# Patient Record
Sex: MALE | Race: WHITE | NOT HISPANIC OR LATINO | Employment: FULL TIME | URBAN - METROPOLITAN AREA
[De-identification: names, ages, dates, MRNs, and addresses within clinical notes are randomized per-mention and may not be internally consistent; named-entity substitution may affect disease eponyms.]

---

## 2018-06-18 ENCOUNTER — TELEPHONE (OUTPATIENT)
Dept: OTOLARYNGOLOGY | Facility: CLINIC | Age: 63
End: 2018-06-18

## 2018-06-20 ENCOUNTER — TELEPHONE (OUTPATIENT)
Dept: OTOLARYNGOLOGY | Facility: CLINIC | Age: 63
End: 2018-06-20

## 2018-06-20 NOTE — TELEPHONE ENCOUNTER
Spoke with patient.  He states that Dr. Neves's office was unable to give disc and he was under the understanding that Dr. Neves had a way to send images to Dr. Segura.  I informed patient that Dr. Segura was waiting to receive images. I will ask Dr. Segura again and call patient back.  Patient is waiting on Dr. Segura's decision to coordinate appt and travel plans.

## 2018-06-20 NOTE — TELEPHONE ENCOUNTER
Spoke with patient and reassured him that he may keep his 7/10/18 appt or if he preferred Dr. Segura would be happy to see him the week before as per Dr. Segura.  Patient decided to keep appt as is.

## 2018-06-21 ENCOUNTER — TELEPHONE (OUTPATIENT)
Dept: OTOLARYNGOLOGY | Facility: CLINIC | Age: 63
End: 2018-06-21

## 2018-06-21 NOTE — TELEPHONE ENCOUNTER
Spoke with spouse and advised her as per Dr. Segura regarding flash drive and appt.  She stated she will bring in flash drive.

## 2018-07-10 ENCOUNTER — OFFICE VISIT (OUTPATIENT)
Dept: OTOLARYNGOLOGY | Facility: CLINIC | Age: 63
End: 2018-07-10
Payer: COMMERCIAL

## 2018-07-10 VITALS
BODY MASS INDEX: 28.07 KG/M2 | DIASTOLIC BLOOD PRESSURE: 96 MMHG | SYSTOLIC BLOOD PRESSURE: 149 MMHG | WEIGHT: 207.25 LBS | HEART RATE: 79 BPM | HEIGHT: 72 IN

## 2018-07-10 DIAGNOSIS — J31.0 CHRONIC RHINITIS: Primary | ICD-10-CM

## 2018-07-10 DIAGNOSIS — M85.88 MASS OF HARD PALATE: ICD-10-CM

## 2018-07-10 PROCEDURE — 99244 OFF/OP CNSLTJ NEW/EST MOD 40: CPT | Mod: 25,S$GLB,, | Performed by: OTOLARYNGOLOGY

## 2018-07-10 PROCEDURE — 99999 PR PBB SHADOW E&M-EST. PATIENT-LVL III: CPT | Mod: PBBFAC,,, | Performed by: OTOLARYNGOLOGY

## 2018-07-10 PROCEDURE — 31231 NASAL ENDOSCOPY DX: CPT | Mod: S$GLB,,, | Performed by: OTOLARYNGOLOGY

## 2018-07-10 RX ORDER — AMOXICILLIN 500 MG
CAPSULE ORAL DAILY
COMMUNITY

## 2018-07-10 RX ORDER — ASPIRIN 81 MG/1
81 TABLET ORAL DAILY
COMMUNITY

## 2018-07-10 RX ORDER — MULTIVITAMIN
1 TABLET ORAL DAILY
COMMUNITY

## 2018-07-10 NOTE — PROGRESS NOTES
Subjective:      Karthikeyan Pinon is a 63 y.o. male who was referred to me by Dr. Asher Aguayo DDS, in consultation for a sinus mass.    He was in his usual state of health until a recent dental evaluation, during which he was noted to have a prominence of the right hard palate.  He underwent CT imaging which revealed a large mass either within or adjacent to the right maxillary sinus.  He states that he was previously unaware of any problem there.  He does recall extensive dental work through the years, and also a remote incident perhaps 20 years ago of pus draining from the right upper gums.  He denies tooth pain, but does note generalized, perennial, mild severity nasal congestion bilaterally.  This seems to be worse at night, and has used occasional OTC spray in the past with limited relief.  He denies facial pain, headache, hyposmia, rhinorrhea, postnasal drip, pruritic symptoms, aural fullness or notable sinus infections.  He denies prior allergy testing or asthma, or prior nasal trauma or surgery.    SNOT-22 score = 21, NOSE score = 30%, ETDQ-7 score = 1.6    Global QOL = 75%    Days missed = Less than 5      Past Medical History  He has a past medical history of Asbestos exposure.    Past Surgical History  He has no past surgical history on file.    Family History  His family history includes Atrial fibrillation in his mother; Diabetes in his mother; Heart failure in his maternal grandmother, mother, and sister; No Known Problems in his brother, father, maternal grandfather, paternal grandfather, and paternal grandmother.    Social History  He reports that he quit smoking about 12 years ago. His smoking use included Cigarettes. He has never used smokeless tobacco.    Allergies  He has No Known Allergies.    Medications   He has a current medication list which includes the following prescription(s): aspirin, fish oil-omega-3 fatty acids, and multivitamin.    Review of Systems  Review of Systems    Constitutional: Negative for fatigue, fever and unexpected weight change.   HENT: Positive for sinus pressure and tinnitus. Negative for congestion, dental problem, ear discharge, ear pain, facial swelling, hearing loss, nosebleeds, postnasal drip, rhinorrhea, sore throat, trouble swallowing and voice change.    Eyes: Negative for photophobia, discharge, itching and visual disturbance.   Respiratory: Negative for apnea, cough, shortness of breath and wheezing.    Cardiovascular: Positive for palpitations. Negative for chest pain.   Gastrointestinal: Negative for abdominal pain, nausea and vomiting.   Endocrine: Negative for cold intolerance and heat intolerance.   Genitourinary: Negative for difficulty urinating.   Musculoskeletal: Negative for arthralgias, back pain, myalgias and neck pain.   Skin: Negative for rash.   Allergic/Immunologic: Negative for environmental allergies and food allergies.   Neurological: Negative for dizziness, seizures, syncope, weakness and headaches.   Hematological: Negative for adenopathy. Does not bruise/bleed easily.   Psychiatric/Behavioral: Negative for decreased concentration, dysphoric mood and sleep disturbance. The patient is not nervous/anxious.           Objective:     BP (!) 149/96   Pulse 79   Ht 6' (1.829 m)   Wt 94 kg (207 lb 3.7 oz)   BMI 28.11 kg/m²        Constitutional:   He appears well-developed. He is cooperative.     Head:  Normocephalic.     Nose:  No mucosal edema, rhinorrhea, septal deviation or polyps. No epistaxis. Turbinates normal, no turbinate masses and no turbinate hypertrophy.  Right sinus exhibits no maxillary sinus tenderness and no frontal sinus tenderness. Left sinus exhibits no maxillary sinus tenderness and no frontal sinus tenderness.   Elevated right nasal floor.    Mouth/Throat  Oropharynx clear and moist without lesions or asymmetry. No oropharyngeal exudate or posterior oropharyngeal erythema.   Broad-based enlargement of right bony  hard palate.  Fullness of right canine fossa.  No mucosal changes or fluctuance.      Neck:  No adenopathy. Normal range of motion present.     He has no cervical adenopathy.       Procedure    Nasal endoscopy performed.  See procedure note.     Left nasal valve     Left MT     Right nasal valve     Right IT compressed by elevated nasal floor     Right MT        Data Reviewed    No results found for: WBC  No results found for: EOSINOPHIL  No results found for: EOS  No results found for: PLT  No results found for: GLU  No results found for: IGE    I independently reviewed the images of the CT sinuses from Dr. Aguayo's office. Pertinent findings include an expensile hypodense soft tissue mass vs fluid within the bone of the right hard palate, abutting the maxillary sinus inferomedially and producing marked elevation of the right nasal floor, without evident bony erosion or destruction.       Assessment:     1. Mass of hard palate    2. Chronic rhinitis         Plan:     I had a long discussion with the patient and his wife regarding his condition and the further workup and management options.  We discussed the possible differential diagnosis, which leans toward a benign lesion based on the apparent chronicity of the findings.  We discussed that diagnosis would require a biopsy, which could only be obtained through surgical incision due to the intact bone around the lesion.  He would benefit from biopsy via a right-sided Couch-Carlos Eduardo approach for the treatment of his condition.  I discussed the risks, benefits and alternatives to surgery with the patient, as well as the expected postoperative course.  I gave him the opportunity to ask questions and I answered all of them.  I provided relevant printed information on his condition for him to review at home.  Same-day discharge is anticipated.  He may have anesthesia triage by telephone.  He will also need to stop taking aspirin 7 days prior to surgery.  The surgery will  be tentatively scheduled for July 20.  He will return for a postoperative visit 1 week after surgery.  Regarding his nasal congestion symptoms, I recommended a graduated approach consisting of nasal saline spray or rinse, OTC fluticasone spray daily, and Allegra or Claritin as needed in the evenings.    Follow-up for surgery.

## 2018-07-10 NOTE — LETTER
July 10, 2018    Asher Aguayo, DDS  [address] [place] [address]         OTOLARYNGOLOGY AND COMMUNICATION SCIENCES    Jose Elias Molina MD, FACS          SURGICAL AND MEDICAL DISEASES OF HEAD AND NECK  MD Jose Elias Peace MD, FACS  Husam Maldonado III, MD    OTOLOGY, NEUROTOLOGY and SKULL-BASE SURGERY  Talib Chappell MD, FACS  Ahsan Toribio MD, Director    PEDIATRIC OTOLARYNGOLOGY & OTOLOGY  ELODIA Dowd MD, FAAP  Teddy Diego MD, FACS    FACIAL PLASTIC and RECONSTRUCTIVE SURGERY  RAMSEY Hyman III, MD, FACS    RHINOLOGY and SINUS SURGERY  Tiago Segura MD, MPH, FACS  Director   RAMSEY Hyman III, MD, FACS    LARYNGOLOGY  Anson Gale MD    SPEECH-LANGUAGE PATHOLOGY  Siomara Escobedo, PhD, [facility]-SLP  Mary Evans, MS, CCC-SLP  Melissa Dukes, MS, CCC-SLP  Jade Gan MA, [facility]-SLP    AUDIOLOGY SECTION  Sia Au, MS, CCC-A  JACKSON Jade, CCC-A  Pam Echevarria, Laura, CCC-A  Laura Ruth, CCC-A  Major Dawn Jr., JACKSON, CCA-A  JACKSON Braden, CCC-A  Laura Burgess, CCC-A    [facility]  Head and Neck Surgical Oncology  DEVIN Fuentes, FNP-C  Pedatric Otolaryngology  DEVIN Pandey, PNP-C       Re:  Karthikeyan Pinon  :  1955    Dear Dr. Aguayo,    Thank you for referring your patient, Karthikeyan Pinon, to us for evaluation and treatment.  I have enclosed a copy of my clinic note for your review and records.  If you have any questions please do not hesitate to contact our office.     Thank you for allowing me to participate in the care of your patient.    Sincerely,        Tiago Segura MD, MPH, FACS    Director, Rhinology and Sinus Surgery  Department of Otorhinolaryngology  [facility]    Encl:  Progress note       [facility] [address] [place] [address]  phone [phone]  fax [phone]  [email or web address]

## 2018-07-10 NOTE — Clinical Note
Mariana, please print the letter and clinic note and fax to Dr. Asher Aguayo at [facility].  Thanks.

## 2018-07-11 ENCOUNTER — TELEPHONE (OUTPATIENT)
Dept: OTOLARYNGOLOGY | Facility: CLINIC | Age: 63
End: 2018-07-11

## 2018-07-11 DIAGNOSIS — M85.88 MASS OF HARD PALATE: Primary | ICD-10-CM

## 2018-07-11 DIAGNOSIS — J31.0 CHRONIC RHINITIS: ICD-10-CM

## 2018-07-11 NOTE — TELEPHONE ENCOUNTER
Spoke with patient and advised him to hold ASA 7 days prior to surgery.  Last day to take would be 7/12/18.

## 2018-07-11 NOTE — PROCEDURES
Nasal/sinus endoscopy  Date/Time: 7/10/2018 11:22 PM  Performed by: RENE AGUILERA  Authorized by: RENE AGUILERA     Consent Done?:  Yes (Verbal)  Anesthesia:     Local anesthetic:  Lidocaine 4% and Royer-Synephrine 1/2%    Patient tolerance:  Patient tolerated the procedure well with no immediate complications  Nose:     Procedure Performed:  Nasal Endoscopy  External:      No external nasal deformity  Intranasal:      Mucosa no polyps     Mucosa ulcers not present     No mucosa lesions present     Turbinates not enlarged     No septum gross deformity  Nasopharynx:      No mucosa lesions     Adenoids not present     Posterior choanae patent     Eustachian tube patent     Elevated right nasal cavity with compression of inferior turbinate and intact mucosa.  No intranasal mass or exudate.

## 2018-07-19 ENCOUNTER — ANESTHESIA EVENT (OUTPATIENT)
Dept: SURGERY | Facility: HOSPITAL | Age: 63
End: 2018-07-19
Payer: COMMERCIAL

## 2018-07-19 ENCOUNTER — TELEPHONE (OUTPATIENT)
Dept: OTOLARYNGOLOGY | Facility: CLINIC | Age: 63
End: 2018-07-19

## 2018-07-19 NOTE — PRE-PROCEDURE INSTRUCTIONS
PreOp Instructions given:   - Verbal medication information (what to hold and what to take)   - NPO guidelines   - Arrival place directions given; time to be given the day before procedure by the   Surgeon's Office   - Bathing with antibacterial soap   - Don't wear any jewelry or bring any valuables AM of surgery   - No makeup or moisturizer to face   - No perfume/cologne, powder, lotions or aftershave   Pt. verbalized understanding.   Denies any family history of side effects or issues with anesthesia or sedation.    THIS WILL BE PT'S 1st SURGICAL EXPERIENCE

## 2018-07-19 NOTE — ANESTHESIA PREPROCEDURE EVALUATION
[facility]  Anesthesia Pre-Operative Evaluation         Patient Name: Karthikeyan Pinon  YOB: 1955  MRN: [id number]    SUBJECTIVE:     Pre-operative evaluation for Procedure(s) (LRB):  STOVER-MADELYN PROCEDURE (Right)     07/19/2018    Karthikeyan Pinon is a 63 y.o. male w/ a significant PMHx of mass of the hard palate and chronic rhinitis.    Patient now presents for the above procedure(s).      LDA: None documented.     Prev airway: None documented.    Drips: None documented.      There is no problem list on file for this patient.      Review of patient's allergies indicates:  No Known Allergies    Current Inpatient Medications:      No current facility-administered medications on file prior to encounter.      Current Outpatient Prescriptions on File Prior to Encounter   Medication Sig Dispense Refill    aspirin (ECOTRIN) 81 MG EC tablet Take 81 mg by mouth once daily.       fish oil-omega-3 fatty acids 300-1,000 mg capsule Take by mouth once daily.      multivitamin (ONE DAILY MULTIVITAMIN) per tablet Take 1 tablet by mouth once daily.         No past surgical history on file.    Social History     Social History    Marital status:      Spouse name: N/A    Number of children: N/A    Years of education: N/A     Occupational History    Not on file.     Social History Main Topics    Smoking status: Former Smoker     Types: Cigarettes     Quit date: 2006    Smokeless tobacco: Never Used    Alcohol use Not on file    Drug use: Unknown    Sexual activity: Not on file     Other Topics Concern    Not on file     Social History Narrative    No narrative on file       OBJECTIVE:     Vital Signs Range (Last 24H):         CBC:   No results for input(s): WBC, RBC, HGB, HCT, PLT, MCV, MCH, MCHC in the last 72 hours.    CMP: No results for input(s): NA, K, CL, CO2, BUN, CREATININE, GLU, MG, PHOS, CALCIUM, ALBUMIN, PROT, ALKPHOS, ALT, AST, BILITOT in the last 72 hours.    INR:  No results  for input(s): PT, INR, PROTIME, APTT in the last 72 hours.    Diagnostic Studies: No relevant studies.    EKG: No recent studies available.    2D ECHO:  No results found for this or any previous visit.      ASSESSMENT/PLAN:       Anesthesia Evaluation    I have reviewed the Patient Summary Reports.     I have reviewed the Medications.     Review of Systems  Anesthesia Hx:  No problems with previous Anesthesia  History of prior surgery of interest to airway management or planning: Denies Family Hx of Anesthesia complications.   Denies Personal Hx of Anesthesia complications.   Social:  Non-Smoker    Hematology/Oncology:  Hematology Normal   Oncology Normal     EENT/Dental:EENT/Dental Normal   Cardiovascular:  Cardiovascular Normal     Pulmonary:  Pulmonary Normal    Renal/:  Renal/ Normal     Hepatic/GI:  Hepatic/GI Normal    Musculoskeletal:  Musculoskeletal Normal    Neurological:  Neurology Normal    Endocrine:  Endocrine Normal    Dermatological:  Skin Normal    Psych:  Psychiatric Normal           Physical Exam  General:  Well nourished    Airway/Jaw/Neck:  Airway Findings: Mouth Opening: Normal Tongue: Normal  General Airway Assessment: Adult  Mallampati: I  Improves to I with phonation.  TM Distance: Normal, at least 6 cm  Jaw/Neck Findings:  Neck ROM: Normal ROM     Eyes/Ears/Nose:  EYES/EARS/NOSE FINDINGS: Normal   Dental:  Dental Findings: In tact   Chest/Lungs:  Chest/Lungs Clear    Heart/Vascular:  Heart Findings: Normal    Abdomen:  Abdomen Findings: Normal    Musculoskeletal:  Musculoskeletal Findings: Normal   Skin:  Skin Findings: Normal    Mental Status:  Mental Status Findings: Normal        Anesthesia Plan  Type of Anesthesia, risks & benefits discussed:  Anesthesia Type:  general  Patient's Preference:   Intra-op Monitoring Plan: standard ASA monitors  Intra-op Monitoring Plan Comments:   Post Op Pain Control Plan: multimodal analgesia, IV/PO Opioids PRN and per primary service following  discharge from PACU  Post Op Pain Control Plan Comments:   Induction:   IV  Beta Blocker:  Patient is not currently on a Beta-Blocker (No further documentation required).       Informed Consent: Patient understands risks and agrees with Anesthesia plan.  Questions answered. Anesthesia consent signed with patient.  ASA Score: 1     Day of Surgery Review of History & Physical:    H&P update referred to the surgeon.         Ready For Surgery From Anesthesia Perspective.

## 2018-07-20 ENCOUNTER — HOSPITAL ENCOUNTER (OUTPATIENT)
Facility: HOSPITAL | Age: 63
Discharge: HOME | End: 2018-07-20
Attending: OTOLARYNGOLOGY | Admitting: OTOLARYNGOLOGY
Payer: COMMERCIAL

## 2018-07-20 ENCOUNTER — SURGERY (OUTPATIENT)
Age: 63
End: 2018-07-20

## 2018-07-20 ENCOUNTER — ANESTHESIA (OUTPATIENT)
Dept: SURGERY | Facility: HOSPITAL | Age: 63
End: 2018-07-20
Payer: COMMERCIAL

## 2018-07-20 VITALS
RESPIRATION RATE: 16 BRPM | OXYGEN SATURATION: 95 % | DIASTOLIC BLOOD PRESSURE: 91 MMHG | HEART RATE: 59 BPM | HEIGHT: 72 IN | WEIGHT: 207 LBS | SYSTOLIC BLOOD PRESSURE: 166 MMHG | TEMPERATURE: 98 F | BODY MASS INDEX: 28.04 KG/M2

## 2018-07-20 DIAGNOSIS — J34.89 MASS OF NASAL SINUS: Primary | ICD-10-CM

## 2018-07-20 PROCEDURE — 87015 SPECIMEN INFECT AGNT CONCNTJ: CPT

## 2018-07-20 PROCEDURE — 88112 CYTOPATH CELL ENHANCE TECH: CPT | Performed by: PATHOLOGY

## 2018-07-20 PROCEDURE — 27000221 HC OXYGEN, UP TO 24 HOURS

## 2018-07-20 PROCEDURE — 25000003 PHARM REV CODE 250: Performed by: OTOLARYNGOLOGY

## 2018-07-20 PROCEDURE — 63600175 PHARM REV CODE 636 W HCPCS: Performed by: OTOLARYNGOLOGY

## 2018-07-20 PROCEDURE — 27201423 OPTIME MED/SURG SUP & DEVICES STERILE SUPPLY: Performed by: OTOLARYNGOLOGY

## 2018-07-20 PROCEDURE — 87070 CULTURE OTHR SPECIMN AEROBIC: CPT

## 2018-07-20 PROCEDURE — 36000707: Performed by: OTOLARYNGOLOGY

## 2018-07-20 PROCEDURE — D9220A PRA ANESTHESIA: Mod: ,,, | Performed by: ANESTHESIOLOGY

## 2018-07-20 PROCEDURE — 94761 N-INVAS EAR/PLS OXIMETRY MLT: CPT

## 2018-07-20 PROCEDURE — 88305 TISSUE EXAM BY PATHOLOGIST: CPT | Mod: 26,,, | Performed by: PATHOLOGY

## 2018-07-20 PROCEDURE — 87206 SMEAR FLUORESCENT/ACID STAI: CPT

## 2018-07-20 PROCEDURE — 37000008 HC ANESTHESIA 1ST 15 MINUTES: Performed by: OTOLARYNGOLOGY

## 2018-07-20 PROCEDURE — 87116 MYCOBACTERIA CULTURE: CPT

## 2018-07-20 PROCEDURE — 63600175 PHARM REV CODE 636 W HCPCS: Performed by: STUDENT IN AN ORGANIZED HEALTH CARE EDUCATION/TRAINING PROGRAM

## 2018-07-20 PROCEDURE — 87102 FUNGUS ISOLATION CULTURE: CPT

## 2018-07-20 PROCEDURE — 87075 CULTR BACTERIA EXCEPT BLOOD: CPT

## 2018-07-20 PROCEDURE — 36000706: Performed by: OTOLARYNGOLOGY

## 2018-07-20 PROCEDURE — 88112 CYTOPATH CELL ENHANCE TECH: CPT | Mod: 26,,, | Performed by: PATHOLOGY

## 2018-07-20 PROCEDURE — 88305 TISSUE EXAM BY PATHOLOGIST: CPT | Performed by: PATHOLOGY

## 2018-07-20 PROCEDURE — 31030 EXPLORATION MAXILLARY SINUS: CPT | Mod: RT,,, | Performed by: OTOLARYNGOLOGY

## 2018-07-20 PROCEDURE — 71000033 HC RECOVERY, INTIAL HOUR: Performed by: OTOLARYNGOLOGY

## 2018-07-20 PROCEDURE — 25000003 PHARM REV CODE 250: Performed by: STUDENT IN AN ORGANIZED HEALTH CARE EDUCATION/TRAINING PROGRAM

## 2018-07-20 PROCEDURE — 71000015 HC POSTOP RECOV 1ST HR: Performed by: OTOLARYNGOLOGY

## 2018-07-20 PROCEDURE — 37000009 HC ANESTHESIA EA ADD 15 MINS: Performed by: OTOLARYNGOLOGY

## 2018-07-20 RX ORDER — MIDAZOLAM HYDROCHLORIDE 1 MG/ML
INJECTION, SOLUTION INTRAMUSCULAR; INTRAVENOUS
Status: DISCONTINUED | OUTPATIENT
Start: 2018-07-20 | End: 2018-07-20

## 2018-07-20 RX ORDER — ACETAMINOPHEN 500 MG
TABLET ORAL
Status: DISCONTINUED
Start: 2018-07-20 | End: 2018-07-20 | Stop reason: HOSPADM

## 2018-07-20 RX ORDER — ROCURONIUM BROMIDE 10 MG/ML
INJECTION, SOLUTION INTRAVENOUS
Status: DISCONTINUED | OUTPATIENT
Start: 2018-07-20 | End: 2018-07-20

## 2018-07-20 RX ORDER — DEXAMETHASONE SODIUM PHOSPHATE 4 MG/ML
INJECTION, SOLUTION INTRA-ARTICULAR; INTRALESIONAL; INTRAMUSCULAR; INTRAVENOUS; SOFT TISSUE
Status: DISCONTINUED | OUTPATIENT
Start: 2018-07-20 | End: 2018-07-20

## 2018-07-20 RX ORDER — HYDROMORPHONE HYDROCHLORIDE 1 MG/ML
0.2 INJECTION, SOLUTION INTRAMUSCULAR; INTRAVENOUS; SUBCUTANEOUS EVERY 5 MIN PRN
Status: DISCONTINUED | OUTPATIENT
Start: 2018-07-20 | End: 2018-07-20 | Stop reason: HOSPADM

## 2018-07-20 RX ORDER — PROPOFOL 10 MG/ML
VIAL (ML) INTRAVENOUS
Status: DISCONTINUED | OUTPATIENT
Start: 2018-07-20 | End: 2018-07-20

## 2018-07-20 RX ORDER — NEOSTIGMINE METHYLSULFATE 1 MG/ML
INJECTION, SOLUTION INTRAVENOUS
Status: DISCONTINUED | OUTPATIENT
Start: 2018-07-20 | End: 2018-07-20

## 2018-07-20 RX ORDER — PHENYLEPHRINE HYDROCHLORIDE 10 MG/ML
INJECTION INTRAVENOUS
Status: DISCONTINUED | OUTPATIENT
Start: 2018-07-20 | End: 2018-07-20

## 2018-07-20 RX ORDER — ONDANSETRON 2 MG/ML
4 INJECTION INTRAMUSCULAR; INTRAVENOUS DAILY PRN
Status: DISCONTINUED | OUTPATIENT
Start: 2018-07-20 | End: 2018-07-20 | Stop reason: HOSPADM

## 2018-07-20 RX ORDER — GLYCOPYRROLATE 0.2 MG/ML
INJECTION INTRAMUSCULAR; INTRAVENOUS
Status: DISCONTINUED | OUTPATIENT
Start: 2018-07-20 | End: 2018-07-20

## 2018-07-20 RX ORDER — CHLORHEXIDINE GLUCONATE ORAL RINSE 1.2 MG/ML
15 SOLUTION DENTAL 2 TIMES DAILY
Qty: 473 ML | Refills: 0 | Status: SHIPPED | OUTPATIENT
Start: 2018-07-20 | End: 2018-08-05

## 2018-07-20 RX ORDER — SODIUM CHLORIDE 9 MG/ML
INJECTION, SOLUTION INTRAVENOUS CONTINUOUS PRN
Status: DISCONTINUED | OUTPATIENT
Start: 2018-07-20 | End: 2018-07-20

## 2018-07-20 RX ORDER — ACETAMINOPHEN 500 MG
1000 TABLET ORAL EVERY 6 HOURS PRN
Qty: 60 TABLET | Refills: 0 | Status: SHIPPED | OUTPATIENT
Start: 2018-07-20

## 2018-07-20 RX ORDER — SODIUM CHLORIDE 0.9 % (FLUSH) 0.9 %
3 SYRINGE (ML) INJECTION
Status: DISCONTINUED | OUTPATIENT
Start: 2018-07-20 | End: 2018-07-20 | Stop reason: HOSPADM

## 2018-07-20 RX ORDER — IBUPROFEN 200 MG
TABLET ORAL
Status: DISCONTINUED
Start: 2018-07-20 | End: 2018-07-20 | Stop reason: HOSPADM

## 2018-07-20 RX ORDER — LIDOCAINE HCL/PF 100 MG/5ML
SYRINGE (ML) INTRAVENOUS
Status: DISCONTINUED | OUTPATIENT
Start: 2018-07-20 | End: 2018-07-20

## 2018-07-20 RX ORDER — ACETAMINOPHEN 500 MG
1000 TABLET ORAL ONCE
Status: COMPLETED | OUTPATIENT
Start: 2018-07-20 | End: 2018-07-20

## 2018-07-20 RX ORDER — LIDOCAINE HYDROCHLORIDE 10 MG/ML
1 INJECTION, SOLUTION EPIDURAL; INFILTRATION; INTRACAUDAL; PERINEURAL ONCE
Status: DISCONTINUED | OUTPATIENT
Start: 2018-07-20 | End: 2018-07-20 | Stop reason: HOSPADM

## 2018-07-20 RX ORDER — IBUPROFEN 800 MG/1
800 TABLET ORAL EVERY 6 HOURS PRN
Qty: 60 TABLET | Refills: 0 | Status: SHIPPED | OUTPATIENT
Start: 2018-07-20

## 2018-07-20 RX ORDER — FENTANYL CITRATE 50 UG/ML
INJECTION, SOLUTION INTRAMUSCULAR; INTRAVENOUS
Status: DISCONTINUED | OUTPATIENT
Start: 2018-07-20 | End: 2018-07-20

## 2018-07-20 RX ORDER — LIDOCAINE HYDROCHLORIDE AND EPINEPHRINE 10; 10 MG/ML; UG/ML
INJECTION, SOLUTION INFILTRATION; PERINEURAL
Status: DISCONTINUED | OUTPATIENT
Start: 2018-07-20 | End: 2018-07-20 | Stop reason: HOSPADM

## 2018-07-20 RX ORDER — IBUPROFEN 200 MG
800 TABLET ORAL ONCE
Status: COMPLETED | OUTPATIENT
Start: 2018-07-20 | End: 2018-07-20

## 2018-07-20 RX ADMIN — PROPOFOL 40 MG: 10 INJECTION, EMULSION INTRAVENOUS at 11:07

## 2018-07-20 RX ADMIN — GLYCOPYRROLATE 0.4 MG: 0.2 INJECTION, SOLUTION INTRAMUSCULAR; INTRAVENOUS at 11:07

## 2018-07-20 RX ADMIN — IBUPROFEN 800 MG: 200 TABLET, FILM COATED ORAL at 11:07

## 2018-07-20 RX ADMIN — NEOSTIGMINE METHYLSULFATE 4 MG: 1 INJECTION INTRAVENOUS at 11:07

## 2018-07-20 RX ADMIN — EPHEDRINE SULFATE 10 MG: 50 INJECTION INTRAMUSCULAR; INTRAVENOUS; SUBCUTANEOUS at 10:07

## 2018-07-20 RX ADMIN — LIDOCAINE HYDROCHLORIDE 100 MG: 20 INJECTION, SOLUTION INTRAVENOUS at 10:07

## 2018-07-20 RX ADMIN — SODIUM CHLORIDE: 0.9 INJECTION, SOLUTION INTRAVENOUS at 10:07

## 2018-07-20 RX ADMIN — PHENYLEPHRINE HYDROCHLORIDE 200 MCG: 10 INJECTION INTRAVENOUS at 10:07

## 2018-07-20 RX ADMIN — FENTANYL CITRATE 100 MCG: 50 INJECTION, SOLUTION INTRAMUSCULAR; INTRAVENOUS at 10:07

## 2018-07-20 RX ADMIN — LIDOCAINE HYDROCHLORIDE AND EPINEPHRINE 4 ML: 10; 10 INJECTION, SOLUTION INFILTRATION; PERINEURAL at 10:07

## 2018-07-20 RX ADMIN — PROPOFOL 160 MG: 10 INJECTION, EMULSION INTRAVENOUS at 10:07

## 2018-07-20 RX ADMIN — PROPOFOL 20 MG: 10 INJECTION, EMULSION INTRAVENOUS at 11:07

## 2018-07-20 RX ADMIN — ROCURONIUM BROMIDE 30 MG: 10 INJECTION, SOLUTION INTRAVENOUS at 10:07

## 2018-07-20 RX ADMIN — ACETAMINOPHEN 1000 MG: 500 TABLET ORAL at 11:07

## 2018-07-20 RX ADMIN — MIDAZOLAM HYDROCHLORIDE 2 MG: 1 INJECTION, SOLUTION INTRAMUSCULAR; INTRAVENOUS at 10:07

## 2018-07-20 RX ADMIN — PROPOFOL 30 MG: 10 INJECTION, EMULSION INTRAVENOUS at 11:07

## 2018-07-20 RX ADMIN — DEXAMETHASONE SODIUM PHOSPHATE 8 MG: 4 INJECTION, SOLUTION INTRAMUSCULAR; INTRAVENOUS at 10:07

## 2018-07-20 RX ADMIN — AMPICILLIN SODIUM AND SULBACTAM SODIUM 3 G: 2; 1 INJECTION, POWDER, FOR SOLUTION INTRAMUSCULAR; INTRAVENOUS at 10:07

## 2018-07-20 NOTE — PLAN OF CARE
AVS reviewed with pt. Pt verbalizes understanding of all instructions, medications, and follow-up appointments. Pt stable, tolerating fluids, no complaints noted. Pt appropriate for discharge at this time.

## 2018-07-20 NOTE — INTERVAL H&P NOTE
The patient has been examined and the H&P has been reviewed:    I concur with the findings and no changes have occurred since H&P was written.    Anesthesia/Surgery risks, benefits and alternative options discussed and understood by patient/family.          Active Hospital Problems    Diagnosis  POA    Mass of nasal sinus [J34.89]  Yes      Resolved Hospital Problems    Diagnosis Date Resolved POA   No resolved problems to display.

## 2018-07-20 NOTE — TRANSFER OF CARE
Anesthesia Transfer of Care Note    Patient: Karthikeyan Pinon    Procedure(s) Performed: Procedure(s) (LRB):  STOVER-MADELYN PROCEDURE (Right)    Patient location: PACU    Anesthesia Type: general    Transport from OR: Transported from OR on 6-10 L/min O2 by face mask with adequate spontaneous ventilation    Post pain: adequate analgesia    Post assessment: no apparent anesthetic complications    Post vital signs: stable    Level of consciousness: awake    Nausea/Vomiting: no nausea/vomiting    Complications: none    Transfer of care protocol was followed      Last vitals:   Visit Vitals  BP (!) 156/97 (BP Location: Right arm, Patient Position: Lying)   Pulse 67   Temp 36.4 °C (97.6 °F) (Oral)   Resp 17   Ht 6' (1.829 m)   Wt 93.9 kg (207 lb)   SpO2 98%   BMI 28.07 kg/m²

## 2018-07-20 NOTE — ANESTHESIA RELEASE NOTE
Anesthesia Release from PACU Note    Patient: Karthikeyan Pinon    Procedure(s) Performed: Procedure(s) (LRB):  STOVER-MADELYN PROCEDURE (Right)    Anesthesia type: general    Post pain: Adequate analgesia    Post assessment: no apparent anesthetic complications, tolerated procedure well and no evidence of recall    Last Vitals:   Visit Vitals  BP (!) 163/93   Pulse (!) 56   Temp 36.6 °C (97.8 °F) (Temporal)   Resp 16   Ht 6' (1.829 m)   Wt 93.9 kg (207 lb)   SpO2 (!) 94%   BMI 28.07 kg/m²       Post vital signs: stable    Level of consciousness: awake, alert  and oriented    Nausea/Vomiting: no nausea/no vomiting    Complications: none    Airway Patency: patent    Respiratory: unassisted    Cardiovascular: stable and blood pressure at baseline    Hydration: euvolemic

## 2018-07-20 NOTE — BRIEF OP NOTE
[facility]  Brief Operative Note     SUMMARY     Surgery Date: 7/20/2018     Surgeon(s) and Role:     * Tiago Segura MD - Primary     * Aníbal Jimenez MD - Resident - Assisting        Pre-op Diagnosis:  Chronic rhinitis [J31.0]  Mass of hard palate [R22.0]    Post-op Diagnosis:  Post-Op Diagnosis Codes:     * Chronic rhinitis [J31.0]     * Mass of hard palate [R22.0]    Procedure(s) (LRB):  STOVER-MADELYN PROCEDURE (Right)    Anesthesia: General    Description of the findings of the procedure: Stover-madelyn antrostomy w/ excision of cyst    Findings/Key Components: large cystic mass in palatal bone    Estimated Blood Loss: * No values recorded between 7/20/2018 10:42 AM and 7/20/2018 11:37 AM *         Specimens:   Specimen (12h ago through future)    Start     Ordered    07/20/18 1127  Specimen to Pathology - Surgery  Once     Comments:  1 Right maxillary alveolar cyst wall- permanent      07/20/18 1127    07/20/18 1120  Specimen to Pathology - Surgery  Once     Comments:  Right maxillary cyst wall - permanent      07/20/18 1120          Discharge Note    SUMMARY     Admit Date: 7/20/2018    Discharge Date and Time:  07/20/2018 11:42 AM    Hospital Course (synopsis of major diagnoses, care, treatment, and services provided during the course of the hospital stay): Following completion of an electively scheduled procedure, the patient was transferred to the PACU for postoperative monitoring. his hospital course was uneventful and noted for adequate pain control and PO intake following surgery. he is discharged home in good condition and will follow-up with [facility] in 2 week.       Final Diagnosis: Post-Op Diagnosis Codes:     * Chronic rhinitis [J31.0]     * Mass of hard palate [R22.0]    Disposition: Home or Self Care    Follow Up/Patient Instructions:     Medications:  Reconciled Home Medications:      Medication List      START taking these medications    acetaminophen 500 MG  tablet  Commonly known as:  TYLENOL  Take 2 tablets (1,000 mg total) by mouth every 6 (six) hours as needed for Pain.     chlorhexidine 0.12 % solution  Commonly known as:  PERIDEX  Use as directed 15 mLs in the mouth or throat 2 (two) times daily. for 14 days     ibuprofen 800 MG tablet  Commonly known as:  ADVIL,MOTRIN  Take 1 tablet (800 mg total) by mouth every 6 (six) hours as needed for Pain.        CONTINUE taking these medications    aspirin 81 MG EC tablet  Commonly known as:  ECOTRIN  Take 81 mg by mouth once daily.     fish oil-omega-3 fatty acids 300-1,000 mg capsule  Take by mouth once daily.     ONE DAILY MULTIVITAMIN per tablet  Generic drug:  multivitamin  Take 1 tablet by mouth once daily.            Discharge Procedure Orders  Diet Adult Regular     Notify your health care provider if you experience any of the following:  temperature >100.4     Notify your health care provider if you experience any of the following:  severe uncontrolled pain     Notify your health care provider if you experience any of the following:  redness, tenderness, or signs of infection (pain, swelling, redness, odor or green/yellow discharge around incision site)     No dressing needed     Activity as tolerated       Follow-up Information     Tiago Segura MD In 2 weeks.    Specialty:  Otolaryngology  Contact information:  [facility] [facility] [id number] [phone]             Follow up In 2 weeks.

## 2018-07-20 NOTE — OP NOTE
DATE OF OPERATION:  7/20/2018    SURGEON:  Tiago Segura MD    ASSISTANT SURGEON:  Aníbal Jimenez DO     OPERATION:     1. Transalveolar biopsy of right maxillary alveolar mass via Couch-Carlos Eduardo approach.  2. Drainage of alveolar cyst.     PREOPERATIVE DIAGNOSIS:      Right maxillary alvelolar mass.     POSTOPERATIVE DIAGNOSIS:      Right maxillary alvelolar cyst.     ANESTHESIA: General.     COMPLICATIONS: None.     ESTIMATED BLOOD LOSS: 10 mL     SPECIMEN: None.     WOUND EXPECTANCY: Clean-contaminated.     FINDINGS: Expansile mass centered in the right maxillary alveolus with superior displacement of the nasal floor and lateral displacement of the maxillary sinus floor.  Spacious cystic cavity filled with motor oil-like fluid and fibrous capsule.     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     The risks, benefits and alternatives of surgical arterial ligation were discussed with the patient as well as the expected postoperative course. I gave him the opportunity to ask questions and I answered all of them. On the morning of surgery I again met with the patient and reviewed the indications for surgery and he consented to proceed.     DESCRIPTION OF PROCEDURE: The patient was brought to the operating room and placed supine on the operating table. The patient was placed under general anesthesia and intubated. The patient was positioned with a donut under the head.  Prophylactic cefazolin was given prior to the surgery start. A time-out was performed to confirm the proper patient, site and procedure.  The CT images were reviewed prior to the surgical start. The patient was prepped and draped in the usual fashion.    Surgery began with an incision in the right sublabial sulcus. The left maxillary gingivolabial sulcus was infiltrated with 1% xylocaine with epinephrine 1:100,000 parts. The incision was deepened with a Bovie and a [place] was used to elevate the periosteum from the maxillary  bone. The infraorbital neurovascular bundle was identified and preserved. Lateral to the canine fossa the maxillary alveolus appeared to bulge anteriorly.  This area was entered laterally and inferiorly to the infraorbital foramen using a 4 mm osteotome. The osteotomy was enlarged using a rongeur. The infraorbital canal was identified in the orbital floor and dissection was limited to greater than 1 cm below this structure.  Upon entry into the lesion there was an immediate efflux of dark brown fluid resembling motor oil.  This was swabbed for culture and also sampled for cytologic analysis.  The osteotomy was enlarged to reveal a spacious cavity with bony limits distinct from the sinonasal cavity.  The maxillary sinus floor was displaced superiorly and laterally.  A fibrous capsule was present which became partially displaced after decompressing the fluid collection.  This capsule was then removed circumferentially and sent to the pathologist for evaluation.  The remaining cavity was treated with Araceli hemostatic agent, and then copiously irrigated.  There was good hemostasis. The oral soft tissue was then closed using locked running 3-0 chromic gut suture.    At this point, the drapes were taken down. The patient was turned back toward the anesthesiologist and awakened from anesthesia.  The patient was then extubated and transferred to the recovery room in stable condition.

## 2018-07-20 NOTE — PROGRESS NOTES
Dr. De La Cruz at bedside assessing pt. Reports BP OK for discharge.  MD will put in release to send patient home.

## 2018-07-20 NOTE — DISCHARGE INSTRUCTIONS
Follow directions given by Dr. Segura  Activity as tolerated  Diet as tolerated  Call MD for concerns      Discharge Instructions: After Your Surgery  Youve just had surgery. During surgery, you were given medicine called anesthesia to keep you relaxed and free of pain. After surgery, you may have some pain or nausea. This is common. Here are some tips for feeling better and getting well after surgery.     Stay on schedule with your medicine.   Going home  Your healthcare provider will show you how to take care of yourself when you go home. He or she will also answer your questions. Have an adult family member or friend drive you home. For the first 24 hours after your surgery:  · Do not drive or use heavy equipment.  · Do not make important decisions or sign legal papers.  · Do not drink alcohol.  · Have someone stay with you, if needed. He or she can watch for problems and help keep you safe.  Be sure to go to all follow-up visits with your healthcare provider. And rest after your surgery for as long as your healthcare provider tells you to.  Coping with pain  If you have pain after surgery, pain medicine will help you feel better. Take it as told, before pain becomes severe. Also, ask your healthcare provider or pharmacist about other ways to control pain. This might be with heat, ice, or relaxation. And follow any other instructions your surgeon or nurse gives you.  Tips for taking pain medicine  To get the best relief possible, remember these points:  · Pain medicines can upset your stomach. Taking them with a little food may help.  · Most pain relievers taken by mouth need at least 20 to 30 minutes to start to work.  · Taking medicine on a schedule can help you remember to take it. Try to time your medicine so that you can take it before starting an activity. This might be before you get dressed, go for a walk, or sit down for dinner.  · Constipation is a common side effect of pain medicines. Call your  healthcare provider before taking any medicines such as laxatives or stool softeners to help ease constipation. Also ask if you should skip any foods. Drinking lots of fluids and eating foods such as fruits and vegetables that are high in fiber can also help. Remember, do not take laxatives unless your surgeon has prescribed them.  · Drinking alcohol and taking pain medicine can cause dizziness and slow your breathing. It can even be deadly. Do not drink alcohol while taking pain medicine.  · Pain medicine can make you react more slowly to things. Do not drive or run machinery while taking pain medicine.  Your healthcare provider may tell you to take acetaminophen to help ease your pain. Ask him or her how much you are supposed to take each day. Acetaminophen or other pain relievers may interact with your prescription medicines or other over-the-counter (OTC) medicines. Some prescription medicines have acetaminophen and other ingredients. Using both prescription and OTC acetaminophen for pain can cause you to overdose. Read the labels on your OTC medicines with care. This will help you to clearly know the list of ingredients, how much to take, and any warnings. It may also help you not take too much acetaminophen. If you have questions or do not understand the information, ask your pharmacist or healthcare provider to explain it to you before you take the OTC medicine.  Managing nausea  Some people have an upset stomach after surgery. This is often because of anesthesia, pain, or pain medicine, or the stress of surgery. These tips will help you handle nausea and eat healthy foods as you get better. If you were on a special food plan before surgery, ask your healthcare provider if you should follow it while you get better. These tips may help:  · Do not push yourself to eat. Your body will tell you when to eat and how much.  · Start off with clear liquids and soup. They are easier to digest.  · Next try semi-solid  foods, such as mashed potatoes, applesauce, and gelatin, as you feel ready.  · Slowly move to solid foods. Dont eat fatty, rich, or spicy foods at first.  · Do not force yourself to have 3 large meals a day. Instead eat smaller amounts more often.  · Take pain medicines with a small amount of solid food, such as crackers or toast, to avoid nausea.     Call your surgeon if  · You still have pain an hour after taking medicine. The medicine may not be strong enough.  · You feel too sleepy, dizzy, or groggy. The medicine may be too strong.  · You have side effects like nausea, vomiting, or skin changes, such as rash, itching, or hives.       If you have obstructive sleep apnea  You were given anesthesia medicine during surgery to keep you comfortable and free of pain. After surgery, you may have more apnea spells because of this medicine and other medicines you were given. The spells may last longer than usual.   At home:  · Keep using the continuous positive airway pressure (CPAP) device when you sleep. Unless your healthcare provider tells you not to, use it when you sleep, day or night. CPAP is a common device used to treat obstructive sleep apnea.  · Talk with your provider before taking any pain medicine, muscle relaxants, or sedatives. Your provider will tell you about the possible dangers of taking these medicines.  Date Last Reviewed: 12/1/2016  © 0886-1682 [employer]. [address] [place] [address], [place] [address]. All rights reserved. This information is not intended as a substitute for professional medical care. Always follow your healthcare professional's instructions.

## 2018-07-20 NOTE — ANESTHESIA POSTPROCEDURE EVALUATION
Anesthesia Post Evaluation    Patient: Karthikeyan Pinon    Procedure(s) Performed: Procedure(s) (LRB):  STOVER-MADELYN PROCEDURE (Right)    Final Anesthesia Type: general  Patient location during evaluation: PACU  Patient participation: Yes- Able to Participate  Level of consciousness: awake and alert and oriented  Post-procedure vital signs: reviewed and stable  Pain management: adequate  Airway patency: patent  PONV status at discharge: No PONV  Anesthetic complications: no      Cardiovascular status: stable  Respiratory status: unassisted  Hydration status: euvolemic  Follow-up not needed.        Visit Vitals  BP (!) 163/93   Pulse (!) 56   Temp 36.6 °C (97.8 °F) (Temporal)   Resp 16   Ht 6' (1.829 m)   Wt 93.9 kg (207 lb)   SpO2 (!) 94%   BMI 28.07 kg/m²       Pain/Ulises Score: Pain Assessment Performed: Yes (7/20/2018 12:36 PM)  Presence of Pain: denies (7/20/2018 12:36 PM)  Pain Rating Prior to Med Admin: 3 (7/20/2018 11:53 AM)  Ulises Score: 9 (7/20/2018 11:45 AM)

## 2018-07-23 LAB
BACTERIA SPEC AEROBE CULT: NORMAL
BACTERIA SPEC ANAEROBE CULT: NORMAL

## 2018-07-31 ENCOUNTER — OFFICE VISIT (OUTPATIENT)
Dept: OTOLARYNGOLOGY | Facility: CLINIC | Age: 63
End: 2018-07-31
Payer: COMMERCIAL

## 2018-07-31 VITALS — HEART RATE: 81 BPM | SYSTOLIC BLOOD PRESSURE: 128 MMHG | DIASTOLIC BLOOD PRESSURE: 78 MMHG

## 2018-07-31 DIAGNOSIS — K09.1: Primary | ICD-10-CM

## 2018-07-31 PROCEDURE — 99024 POSTOP FOLLOW-UP VISIT: CPT | Mod: S$GLB,,, | Performed by: OTOLARYNGOLOGY

## 2018-07-31 PROCEDURE — 99999 PR PBB SHADOW E&M-EST. PATIENT-LVL II: CPT | Mod: PBBFAC,,, | Performed by: OTOLARYNGOLOGY

## 2018-07-31 NOTE — PROGRESS NOTES
Subjective:      Karthikeyan Pinon is a 63 y.o. male who comes for follow-up 10 days status-post Couch-Carlos Eduardo approach to right maxillary palatal cyst.  His last visit with me was on 7/10/2018.  Doing well, minor swelling and nasal mucus.    QOL assessment deferred.      Objective:     /78   Pulse 81      General:   not in distress   Nasal:  edematous mucosa   incision intact   no septal hematoma   no bleeding   Oral Cavity:   clear   right upper gingival incision intact   sutures still dissolving   Oropharynx:   no bleeding   Neck:   nontender       Procedure     None        Data Reviewed    Pathology reports still pending.  Cultures all negative to date.      Assessment:     Doing well following Couch-Carlos Eduardo approach to drainage and biopsy of a right maxillary palatal cyst.    1. Median anterior maxillary cyst         Plan:     Discussed f/u and care w/ patient.  Etiology most likely related to remote prior dental work.  Will f/u with Dr. Aguayo for future surveillance and any needed referrals.  Follow-up if symptoms worsen or fail to improve.

## 2018-08-17 ENCOUNTER — TELEPHONE (OUTPATIENT)
Dept: OTOLARYNGOLOGY | Facility: CLINIC | Age: 63
End: 2018-08-17

## 2018-08-17 NOTE — TELEPHONE ENCOUNTER
Patient called inquiring on test results.  I called lab for estimated turnaround.  Fungal culture approx 4 weeks and AFB approx 8 weeks.  Patient advised that so far now growth and will call him when final results are in.

## 2018-08-22 LAB — FUNGUS SPEC CULT: NORMAL

## 2018-09-21 LAB
ACID FAST MOD KINY STN SPEC: NORMAL
MYCOBACTERIUM SPEC QL CULT: NORMAL

## 2018-09-26 ENCOUNTER — TELEPHONE (OUTPATIENT)
Dept: OTOLARYNGOLOGY | Facility: CLINIC | Age: 63
End: 2018-09-26

## 2020-05-29 ENCOUNTER — TELEPHONE (OUTPATIENT)
Dept: OTOLARYNGOLOGY | Facility: CLINIC | Age: 65
End: 2020-05-29

## 2020-12-09 ENCOUNTER — OFFICE VISIT (OUTPATIENT)
Dept: OTOLARYNGOLOGY | Facility: CLINIC | Age: 65
End: 2020-12-09
Payer: COMMERCIAL

## 2020-12-09 VITALS — SYSTOLIC BLOOD PRESSURE: 145 MMHG | DIASTOLIC BLOOD PRESSURE: 87 MMHG | HEART RATE: 78 BPM

## 2020-12-09 DIAGNOSIS — K09.1: Primary | ICD-10-CM

## 2020-12-09 DIAGNOSIS — J31.0 CHRONIC RHINITIS: ICD-10-CM

## 2020-12-09 PROCEDURE — 1126F PR PAIN SEVERITY QUANTIFIED, NO PAIN PRESENT: ICD-10-PCS | Mod: S$GLB,,, | Performed by: OTOLARYNGOLOGY

## 2020-12-09 PROCEDURE — 99999 PR PBB SHADOW E&M-EST. PATIENT-LVL III: ICD-10-PCS | Mod: PBBFAC,,, | Performed by: OTOLARYNGOLOGY

## 2020-12-09 PROCEDURE — 99213 OFFICE O/P EST LOW 20 MIN: CPT | Mod: S$GLB,,, | Performed by: OTOLARYNGOLOGY

## 2020-12-09 PROCEDURE — 99999 PR PBB SHADOW E&M-EST. PATIENT-LVL III: CPT | Mod: PBBFAC,,, | Performed by: OTOLARYNGOLOGY

## 2020-12-09 PROCEDURE — 99213 PR OFFICE/OUTPT VISIT, EST, LEVL III, 20-29 MIN: ICD-10-PCS | Mod: S$GLB,,, | Performed by: OTOLARYNGOLOGY

## 2020-12-09 PROCEDURE — 1126F AMNT PAIN NOTED NONE PRSNT: CPT | Mod: S$GLB,,, | Performed by: OTOLARYNGOLOGY

## 2020-12-09 NOTE — PROGRESS NOTES
Subjective:      Karthikeyan is a 65 y.o. male who comes for follow-up of a palatal-alveolar cyst.  His last visit with me was on 7/31/2018.  Now 2-1/2 years status-post extirpative surgery.   He reports that over the past 2+ years he has done will without problems related to the surgery.  However, this fall he became aware that the 2 front upper central incisors no longer aligned, with  The left one protruding inferiorly more than the right one and producing an asymmetric stepped-off appearance.  He notes no other symptoms such as gingival pain, malocclusion, fistula, swelling or drainage in the mouth.  He reportedly brought this to his dentist's attention who suggested seeing me, though reportedly no imaging was done.  He notes occasional diffuse bifacial pressure episodes that occur typically upon awakening and then subside.  There is associated mild congestion though he has not specifically medicated for this.      The patient's medications, allergies, past medical, surgical, social and family histories were reviewed and updated as appropriate.    A detailed review of systems was obtained with pertinent positives as per the above HPI, and otherwise negative.        Objective:     BP (!) 145/87   Pulse 78        Constitutional:   He appears well-developed. He is cooperative.     Head:  Normocephalic.     Nose:  Mucosal edema present. No rhinorrhea, septal deviation or polyps. No epistaxis. Turbinates normal, no turbinate masses and no turbinate hypertrophy.  Right sinus exhibits no maxillary sinus tenderness and no frontal sinus tenderness. Left sinus exhibits no maxillary sinus tenderness and no frontal sinus tenderness.   Minor nonpurulent mucus    Mouth/Throat  Oropharynx clear and moist without lesions or asymmetry. No oropharyngeal exudate or posterior oropharyngeal erythema.   Mild asymmetry of palate, though much flatter than at prior exam in 2018  No fluctuance or mucosal changes of palate or gingival ridge  around incisors  Well-healed right sublabial incision, also without underlying fluctuance      Neck:  No adenopathy. Normal range of motion present.     He has no cervical adenopathy.       Procedure    None      Data Reviewed    No results found for: WBC  No results found for: EOSINOPHIL  No results found for: EOS  No results found for: PLT  No results found for: GLU  No results found for: IGE    Pathology report indicated a benign alveolar cyst.           Assessment:     1. Naso-alveolar cyst    2. Chronic rhinitis         Plan:     We had a long discussion about the likely etiology of his condition.  There is no palpable or visible manifestation of re-collection of the cyst material, and he has no symptoms to suggest that.  The asymmetry may have arisen from the expected contraction of the cyst cavity, with resultant inward retraction of the right incisor root while the left incisor remained in native position.  I advised that CT imaging would be helpful to ascertain contraction versus reaccumulation of cyst material.  He wishes to defer at this time and observe for additional symptoms.  I advised that the nasal symptoms are suggestive of environmental allergy, which may be treated initially with saline spray.  Follow up if symptoms worsen or fail to improve.

## 2024-01-29 ENCOUNTER — TELEPHONE (OUTPATIENT)
Dept: CARDIOTHORACIC SURGERY | Facility: CLINIC | Age: 69
End: 2024-01-29
Payer: COMMERCIAL

## 2024-01-29 NOTE — TELEPHONE ENCOUNTER
Returned missed call to pt on Friday and scheduled him for first available consultation appt with Dr. Lopez for evaluation of HOCM. Advised pt we will request imaging records from [facility] of his recent cardiac MRI. Faxed request to [facility] medical records dept at [phone].   Follow up today with [facility] and was advised they are in receipt of request and imaging department should be sending over to us via [employer].   Saw that pt cancelled his appt. Attempted call to pt to see if he would like to reschedule. No answer, left VM with request for call back and provided my direct line.

## 2024-02-02 ENCOUNTER — TELEPHONE (OUTPATIENT)
Dept: CARDIOTHORACIC SURGERY | Facility: CLINIC | Age: 69
End: 2024-02-02
Payer: COMMERCIAL

## 2024-04-05 ENCOUNTER — HOSPITAL ENCOUNTER (OUTPATIENT)
Facility: HOSPITAL | Age: 69
Discharge: HOME | End: 2024-04-06
Attending: EMERGENCY MEDICINE | Admitting: INTERNAL MEDICINE
Payer: COMMERCIAL

## 2024-04-05 DIAGNOSIS — Z98.890 POST-OPERATIVE STATE: ICD-10-CM

## 2024-04-05 DIAGNOSIS — I48.91 A-FIB: ICD-10-CM

## 2024-04-05 DIAGNOSIS — I42.1 HOCM (HYPERTROPHIC OBSTRUCTIVE CARDIOMYOPATHY): Primary | ICD-10-CM

## 2024-04-05 DIAGNOSIS — R07.9 CHEST PAIN: ICD-10-CM

## 2024-04-05 PROBLEM — D69.6 THROMBOCYTOPENIA: Status: ACTIVE | Noted: 2024-03-29

## 2024-04-05 PROBLEM — R74.01 TRANSAMINITIS: Status: ACTIVE | Noted: 2024-04-05

## 2024-04-05 PROBLEM — R79.89 ELEVATED TROPONIN: Status: ACTIVE | Noted: 2024-04-05

## 2024-04-05 PROBLEM — E03.8 SUBCLINICAL HYPOTHYROIDISM: Status: ACTIVE | Noted: 2024-04-05

## 2024-04-05 PROBLEM — Q23.1 BICUSPID AORTIC VALVE: Status: ACTIVE | Noted: 2023-12-11

## 2024-04-05 PROBLEM — I10 PRIMARY HYPERTENSION: Status: ACTIVE | Noted: 2024-03-28

## 2024-04-05 PROBLEM — E78.5 HLD (HYPERLIPIDEMIA): Status: ACTIVE | Noted: 2024-03-28

## 2024-04-05 PROBLEM — J98.11 ATELECTASIS: Status: ACTIVE | Noted: 2024-03-29

## 2024-04-05 PROBLEM — D62 ABLA (ACUTE BLOOD LOSS ANEMIA): Status: ACTIVE | Noted: 2024-03-28

## 2024-04-05 PROBLEM — I47.29 NSVT (NONSUSTAINED VENTRICULAR TACHYCARDIA): Status: ACTIVE | Noted: 2023-12-11

## 2024-04-05 LAB
ALBUMIN SERPL BCP-MCNC: 3.1 G/DL (ref 3.5–5.2)
ALP SERPL-CCNC: 131 U/L (ref 55–135)
ALT SERPL W/O P-5'-P-CCNC: 76 U/L (ref 10–44)
ANION GAP SERPL CALC-SCNC: 8 MMOL/L (ref 8–16)
AST SERPL-CCNC: 64 U/L (ref 10–40)
BASOPHILS # BLD AUTO: 0.07 K/UL (ref 0–0.2)
BASOPHILS NFR BLD: 0.7 % (ref 0–1.9)
BILIRUB SERPL-MCNC: 0.4 MG/DL (ref 0.1–1)
BILIRUB UR QL STRIP: NEGATIVE
BNP SERPL-MCNC: 917 PG/ML (ref 0–99)
BUN SERPL-MCNC: 24 MG/DL (ref 8–23)
CALCIUM SERPL-MCNC: 9.5 MG/DL (ref 8.7–10.5)
CHLORIDE SERPL-SCNC: 109 MMOL/L (ref 95–110)
CLARITY UR REFRACT.AUTO: CLEAR
CO2 SERPL-SCNC: 21 MMOL/L (ref 23–29)
COLOR UR AUTO: YELLOW
CREAT SERPL-MCNC: 1 MG/DL (ref 0.5–1.4)
DIFFERENTIAL METHOD BLD: ABNORMAL
EOSINOPHIL # BLD AUTO: 0.4 K/UL (ref 0–0.5)
EOSINOPHIL NFR BLD: 3.3 % (ref 0–8)
ERYTHROCYTE [DISTWIDTH] IN BLOOD BY AUTOMATED COUNT: 14.1 % (ref 11.5–14.5)
EST. GFR  (NO RACE VARIABLE): >60 ML/MIN/1.73 M^2
GLUCOSE SERPL-MCNC: 103 MG/DL (ref 70–110)
GLUCOSE UR QL STRIP: NEGATIVE
HAV IGM SERPL QL IA: NORMAL
HBV CORE IGM SERPL QL IA: NORMAL
HBV SURFACE AG SERPL QL IA: NORMAL
HCT VFR BLD AUTO: 32.6 % (ref 40–54)
HCV AB SERPL QL IA: NORMAL
HGB BLD-MCNC: 10.9 G/DL (ref 14–18)
HGB UR QL STRIP: NEGATIVE
IMM GRANULOCYTES # BLD AUTO: 0.13 K/UL (ref 0–0.04)
IMM GRANULOCYTES NFR BLD AUTO: 1.2 % (ref 0–0.5)
KETONES UR QL STRIP: NEGATIVE
LEUKOCYTE ESTERASE UR QL STRIP: NEGATIVE
LYMPHOCYTES # BLD AUTO: 2.1 K/UL (ref 1–4.8)
LYMPHOCYTES NFR BLD: 19.9 % (ref 18–48)
MAGNESIUM SERPL-MCNC: 2.2 MG/DL (ref 1.6–2.6)
MCH RBC QN AUTO: 30.2 PG (ref 27–31)
MCHC RBC AUTO-ENTMCNC: 33.4 G/DL (ref 32–36)
MCV RBC AUTO: 90 FL (ref 82–98)
MONOCYTES # BLD AUTO: 1.4 K/UL (ref 0.3–1)
MONOCYTES NFR BLD: 13.5 % (ref 4–15)
NEUTROPHILS # BLD AUTO: 6.4 K/UL (ref 1.8–7.7)
NEUTROPHILS NFR BLD: 61.4 % (ref 38–73)
NITRITE UR QL STRIP: NEGATIVE
NRBC BLD-RTO: 0 /100 WBC
OHS QRS DURATION: 148 MS
OHS QRS DURATION: 150 MS
OHS QRS DURATION: 150 MS
OHS QRS DURATION: 154 MS
OHS QRS DURATION: 156 MS
OHS QTC CALCULATION: 459 MS
OHS QTC CALCULATION: 499 MS
OHS QTC CALCULATION: 515 MS
OHS QTC CALCULATION: 529 MS
OHS QTC CALCULATION: 541 MS
PH UR STRIP: 5 [PH] (ref 5–8)
PLATELET # BLD AUTO: 269 K/UL (ref 150–450)
PMV BLD AUTO: 8.6 FL (ref 9.2–12.9)
POC CARDIAC TROPONIN I: 0.21 NG/ML (ref 0–0.08)
POCT GLUCOSE: 102 MG/DL (ref 70–110)
POCT GLUCOSE: 97 MG/DL (ref 70–110)
POTASSIUM SERPL-SCNC: 4 MMOL/L (ref 3.5–5.1)
PROT SERPL-MCNC: 6.5 G/DL (ref 6–8.4)
PROT UR QL STRIP: NEGATIVE
RBC # BLD AUTO: 3.61 M/UL (ref 4.6–6.2)
SAMPLE: ABNORMAL
SODIUM SERPL-SCNC: 138 MMOL/L (ref 136–145)
SP GR UR STRIP: 1.03 (ref 1–1.03)
T4 FREE SERPL-MCNC: 0.92 NG/DL (ref 0.71–1.51)
TROPONIN I SERPL DL<=0.01 NG/ML-MCNC: 0.4 NG/ML (ref 0–0.03)
TROPONIN I SERPL DL<=0.01 NG/ML-MCNC: 0.53 NG/ML (ref 0–0.03)
TSH SERPL DL<=0.005 MIU/L-ACNC: 5.19 UIU/ML (ref 0.4–4)
URN SPEC COLLECT METH UR: NORMAL
WBC # BLD AUTO: 10.45 K/UL (ref 3.9–12.7)

## 2024-04-05 PROCEDURE — 99204 OFFICE O/P NEW MOD 45 MIN: CPT | Mod: ,,, | Performed by: INTERNAL MEDICINE

## 2024-04-05 PROCEDURE — 80074 ACUTE HEPATITIS PANEL: CPT

## 2024-04-05 PROCEDURE — 25000003 PHARM REV CODE 250

## 2024-04-05 PROCEDURE — 93005 ELECTROCARDIOGRAM TRACING: CPT

## 2024-04-05 PROCEDURE — 83880 ASSAY OF NATRIURETIC PEPTIDE: CPT

## 2024-04-05 PROCEDURE — 93010 ELECTROCARDIOGRAM REPORT: CPT | Mod: 76,,, | Performed by: INTERNAL MEDICINE

## 2024-04-05 PROCEDURE — 25000003 PHARM REV CODE 250: Performed by: INTERNAL MEDICINE

## 2024-04-05 PROCEDURE — 81003 URINALYSIS AUTO W/O SCOPE: CPT

## 2024-04-05 PROCEDURE — 25000003 PHARM REV CODE 250: Performed by: PHYSICIAN ASSISTANT

## 2024-04-05 PROCEDURE — G0378 HOSPITAL OBSERVATION PER HR: HCPCS

## 2024-04-05 PROCEDURE — 96374 THER/PROPH/DIAG INJ IV PUSH: CPT

## 2024-04-05 PROCEDURE — 82962 GLUCOSE BLOOD TEST: CPT

## 2024-04-05 PROCEDURE — 99285 EMERGENCY DEPT VISIT HI MDM: CPT | Mod: 25

## 2024-04-05 PROCEDURE — 84484 ASSAY OF TROPONIN QUANT: CPT

## 2024-04-05 PROCEDURE — 84439 ASSAY OF FREE THYROXINE: CPT

## 2024-04-05 PROCEDURE — 80053 COMPREHEN METABOLIC PANEL: CPT

## 2024-04-05 PROCEDURE — 83735 ASSAY OF MAGNESIUM: CPT

## 2024-04-05 PROCEDURE — 85025 COMPLETE CBC W/AUTO DIFF WBC: CPT

## 2024-04-05 PROCEDURE — 84484 ASSAY OF TROPONIN QUANT: CPT | Mod: 91 | Performed by: INTERNAL MEDICINE

## 2024-04-05 PROCEDURE — 93010 ELECTROCARDIOGRAM REPORT: CPT | Mod: ,,, | Performed by: INTERNAL MEDICINE

## 2024-04-05 PROCEDURE — 84443 ASSAY THYROID STIM HORMONE: CPT

## 2024-04-05 RX ORDER — METOPROLOL TARTRATE 1 MG/ML
5 INJECTION, SOLUTION INTRAVENOUS EVERY 5 MIN PRN
Status: DISCONTINUED | OUTPATIENT
Start: 2024-04-05 | End: 2024-04-06 | Stop reason: HOSPADM

## 2024-04-05 RX ORDER — ACETAMINOPHEN 325 MG/1
650 TABLET ORAL EVERY 4 HOURS PRN
Status: DISCONTINUED | OUTPATIENT
Start: 2024-04-05 | End: 2024-04-06 | Stop reason: HOSPADM

## 2024-04-05 RX ORDER — LANOLIN ALCOHOL/MO/W.PET/CERES
400 CREAM (GRAM) TOPICAL DAILY
COMMUNITY
Start: 2024-03-31

## 2024-04-05 RX ORDER — ASPIRIN 81 MG/1
81 TABLET ORAL DAILY
Status: DISCONTINUED | OUTPATIENT
Start: 2024-04-05 | End: 2024-04-06 | Stop reason: HOSPADM

## 2024-04-05 RX ORDER — ATORVASTATIN CALCIUM 10 MG/1
10 TABLET, FILM COATED ORAL NIGHTLY
COMMUNITY
Start: 2023-07-27

## 2024-04-05 RX ORDER — POLYETHYLENE GLYCOL 3350 17 G/17G
17 POWDER, FOR SOLUTION ORAL 2 TIMES DAILY PRN
Status: DISCONTINUED | OUTPATIENT
Start: 2024-04-05 | End: 2024-04-06 | Stop reason: HOSPADM

## 2024-04-05 RX ORDER — METOPROLOL SUCCINATE 50 MG/1
50 TABLET, EXTENDED RELEASE ORAL DAILY
Status: DISCONTINUED | OUTPATIENT
Start: 2024-04-05 | End: 2024-04-06 | Stop reason: HOSPADM

## 2024-04-05 RX ORDER — ACETAMINOPHEN 500 MG
1000 TABLET ORAL EVERY 8 HOURS PRN
Status: DISCONTINUED | OUTPATIENT
Start: 2024-04-05 | End: 2024-04-06 | Stop reason: HOSPADM

## 2024-04-05 RX ORDER — POTASSIUM CHLORIDE 750 MG/1
10 TABLET, EXTENDED RELEASE ORAL DAILY
COMMUNITY
Start: 2024-03-31

## 2024-04-05 RX ORDER — PROCHLORPERAZINE EDISYLATE 5 MG/ML
5 INJECTION INTRAMUSCULAR; INTRAVENOUS EVERY 6 HOURS PRN
Status: DISCONTINUED | OUTPATIENT
Start: 2024-04-05 | End: 2024-04-06 | Stop reason: HOSPADM

## 2024-04-05 RX ORDER — BISACODYL 10 MG/1
10 SUPPOSITORY RECTAL DAILY PRN
Status: DISCONTINUED | OUTPATIENT
Start: 2024-04-05 | End: 2024-04-06 | Stop reason: HOSPADM

## 2024-04-05 RX ORDER — ATORVASTATIN CALCIUM 10 MG/1
10 TABLET, FILM COATED ORAL NIGHTLY
Status: DISCONTINUED | OUTPATIENT
Start: 2024-04-05 | End: 2024-04-06 | Stop reason: HOSPADM

## 2024-04-05 RX ORDER — ALUMINUM HYDROXIDE, MAGNESIUM HYDROXIDE, AND SIMETHICONE 1200; 120; 1200 MG/30ML; MG/30ML; MG/30ML
30 SUSPENSION ORAL 4 TIMES DAILY PRN
Status: DISCONTINUED | OUTPATIENT
Start: 2024-04-05 | End: 2024-04-06 | Stop reason: HOSPADM

## 2024-04-05 RX ORDER — ZINC GLUCONATE 50 MG
25 TABLET ORAL DAILY
COMMUNITY
End: 2024-04-05

## 2024-04-05 RX ORDER — SODIUM CHLORIDE 0.9 % (FLUSH) 0.9 %
5 SYRINGE (ML) INJECTION
Status: DISCONTINUED | OUTPATIENT
Start: 2024-04-05 | End: 2024-04-06 | Stop reason: HOSPADM

## 2024-04-05 RX ORDER — POTASSIUM CHLORIDE 750 MG/1
10 CAPSULE, EXTENDED RELEASE ORAL DAILY
Status: DISCONTINUED | OUTPATIENT
Start: 2024-04-06 | End: 2024-04-06 | Stop reason: HOSPADM

## 2024-04-05 RX ORDER — OXYCODONE HYDROCHLORIDE 5 MG/1
5 TABLET ORAL EVERY 8 HOURS PRN
Status: ON HOLD | COMMUNITY
Start: 2024-03-31 | End: 2024-04-06 | Stop reason: HOSPADM

## 2024-04-05 RX ORDER — METOPROLOL SUCCINATE 50 MG/1
25 TABLET, EXTENDED RELEASE ORAL 2 TIMES DAILY
COMMUNITY
Start: 2023-11-15

## 2024-04-05 RX ORDER — SIMETHICONE 80 MG
1 TABLET,CHEWABLE ORAL 4 TIMES DAILY PRN
Status: DISCONTINUED | OUTPATIENT
Start: 2024-04-05 | End: 2024-04-06 | Stop reason: HOSPADM

## 2024-04-05 RX ORDER — ONDANSETRON 8 MG/1
8 TABLET, ORALLY DISINTEGRATING ORAL EVERY 8 HOURS PRN
Status: DISCONTINUED | OUTPATIENT
Start: 2024-04-05 | End: 2024-04-06 | Stop reason: HOSPADM

## 2024-04-05 RX ORDER — LANOLIN ALCOHOL/MO/W.PET/CERES
400 CREAM (GRAM) TOPICAL DAILY
Status: DISCONTINUED | OUTPATIENT
Start: 2024-04-06 | End: 2024-04-06 | Stop reason: HOSPADM

## 2024-04-05 RX ORDER — NALOXONE HCL 0.4 MG/ML
0.02 VIAL (ML) INJECTION
Status: DISCONTINUED | OUTPATIENT
Start: 2024-04-05 | End: 2024-04-06 | Stop reason: HOSPADM

## 2024-04-05 RX ORDER — PANTOPRAZOLE SODIUM 20 MG/1
20 TABLET, DELAYED RELEASE ORAL DAILY
COMMUNITY
Start: 2024-03-31

## 2024-04-05 RX ORDER — HYDROCHLOROTHIAZIDE 25 MG/1
25 TABLET ORAL DAILY
COMMUNITY
End: 2024-04-05

## 2024-04-05 RX ORDER — METOPROLOL TARTRATE 50 MG/1
50 TABLET ORAL
Status: COMPLETED | OUTPATIENT
Start: 2024-04-05 | End: 2024-04-05

## 2024-04-05 RX ORDER — MECOBALAMIN 1000 MCG
TABLET,CHEWABLE ORAL
COMMUNITY
End: 2024-04-05

## 2024-04-05 RX ORDER — FUROSEMIDE 20 MG/1
20 TABLET ORAL DAILY
COMMUNITY
Start: 2024-04-01 | End: 2024-04-05

## 2024-04-05 RX ORDER — TALC
6 POWDER (GRAM) TOPICAL NIGHTLY PRN
Status: DISCONTINUED | OUTPATIENT
Start: 2024-04-05 | End: 2024-04-06 | Stop reason: HOSPADM

## 2024-04-05 RX ORDER — IPRATROPIUM BROMIDE AND ALBUTEROL SULFATE 2.5; .5 MG/3ML; MG/3ML
3 SOLUTION RESPIRATORY (INHALATION) EVERY 4 HOURS PRN
Status: DISCONTINUED | OUTPATIENT
Start: 2024-04-05 | End: 2024-04-06 | Stop reason: HOSPADM

## 2024-04-05 RX ORDER — METOPROLOL TARTRATE 1 MG/ML
5 INJECTION, SOLUTION INTRAVENOUS
Status: COMPLETED | OUTPATIENT
Start: 2024-04-05 | End: 2024-04-05

## 2024-04-05 RX ORDER — PANTOPRAZOLE SODIUM 20 MG/1
20 TABLET, DELAYED RELEASE ORAL DAILY
Status: DISCONTINUED | OUTPATIENT
Start: 2024-04-06 | End: 2024-04-06 | Stop reason: HOSPADM

## 2024-04-05 RX ORDER — DEXTROAMPHETAMINE SACCHARATE, AMPHETAMINE ASPARTATE MONOHYDRATE, DEXTROAMPHETAMINE SULFATE AND AMPHETAMINE SULFATE 5; 5; 5; 5 MG/1; MG/1; MG/1; MG/1
20 CAPSULE, EXTENDED RELEASE ORAL EVERY MORNING
COMMUNITY
End: 2024-04-05

## 2024-04-05 RX ADMIN — ATORVASTATIN CALCIUM 10 MG: 10 TABLET, FILM COATED ORAL at 10:04

## 2024-04-05 RX ADMIN — ACETAMINOPHEN 1000 MG: 500 TABLET ORAL at 11:04

## 2024-04-05 RX ADMIN — METOPROLOL TARTRATE 50 MG: 50 TABLET, FILM COATED ORAL at 03:04

## 2024-04-05 RX ADMIN — APIXABAN 5 MG: 5 TABLET, FILM COATED ORAL at 10:04

## 2024-04-05 RX ADMIN — ASPIRIN 81 MG: 81 TABLET, COATED ORAL at 11:04

## 2024-04-05 RX ADMIN — METOROPROLOL TARTRATE 5 MG: 5 INJECTION, SOLUTION INTRAVENOUS at 03:04

## 2024-04-05 RX ADMIN — METOPROLOL SUCCINATE 50 MG: 50 TABLET, EXTENDED RELEASE ORAL at 11:04

## 2024-04-05 RX ADMIN — ACETAMINOPHEN 1000 MG: 500 TABLET ORAL at 10:04

## 2024-04-05 NOTE — ED PROVIDER NOTES
"Encounter Date: 2024     Source of History:   Patient, patient's wife, and medical record, without language barrier or      Chief complaint:  Post-op Problem (Reports having "cardiac procedure" 24. Reports BP 85/73 HR of 150 at home. Denies CP at this time)    HPI:  Karthikeyan Pinon is a 68 y.o. male with history of hypertrophic cardiomyopathy status post myomectomy and aortic valve replacement on 3/28 presenting with chief complaint of postop problem.  Patient reports having rapid heart rate and episode of hypotension earlier today.  His heart rate has been varying from the 90s to 150s and he has been in AFib since arriving in the ED. He does not have a history of AFib.  He denies chest pain or shortness of breath, syncope.  He endorses transient lightheadedness that has since resolved    This is the extent to the patients complaints today here in the emergency department.    ROS: As per HPI and below:  ROS    Review of patient's allergies indicates:  No Known Allergies  PMH:  As per HPI and below:  Past Medical History:   Diagnosis Date    Asbestos exposure      Past Surgical History:   Procedure Laterality Date    STOVER MADELYN Right 2018    Procedure: STOVER-MADELYN PROCEDURE;  Surgeon: Tiago Segura MD;  Location: [facility] OR [address] [place] [address];  Service: ENT;  Laterality: Right;     Social History     Tobacco Use    Smoking status: Former     Current packs/day: 0.00     Types: Cigarettes     Quit date:      Years since quittin.2    Smokeless tobacco: Never     Vitals:    /78   Pulse 107   Temp 98.9 °F (37.2 °C) (Oral)   Resp 11   Ht 6' (1.829 m)   Wt 92.1 kg (203 lb)   SpO2 96%   BMI 27.53 kg/m²     Physical Exam  Vitals and nursing note reviewed.   Constitutional:       General: He is not in acute distress.     Appearance: He is not toxic-appearing.   HENT:      Head: Normocephalic and atraumatic.   Eyes:      General: No scleral icterus.  Cardiovascular:      Rate and Rhythm: " Tachycardia present. Rhythm irregular.      Pulses: Normal pulses.      Heart sounds: Normal heart sounds. No murmur heard.     No friction rub. No gallop.      Comments: Well-healing sternotomy scar, C/D/I  Pulmonary:      Effort: Pulmonary effort is normal. No respiratory distress.      Breath sounds: Normal breath sounds. No stridor. No wheezing, rhonchi or rales.   Abdominal:      General: Abdomen is flat. There is no distension.      Palpations: Abdomen is soft.      Tenderness: There is no abdominal tenderness. There is no guarding.   Musculoskeletal:         General: No swelling or deformity.      Cervical back: Normal range of motion and neck supple.      Right lower leg: No edema.      Left lower leg: No edema.   Skin:     General: Skin is warm and dry.      Coloration: Skin is not jaundiced.      Findings: No rash.   Neurological:      Mental Status: He is alert. Mental status is at baseline.       Procedures    Laboratory Studies:  Labs that have been ordered have been independently reviewed and interpreted by myself.  Labs Reviewed   CBC W/ AUTO DIFFERENTIAL - Abnormal; Notable for the following components:       Result Value    RBC 3.61 (*)     Hemoglobin 10.9 (*)     Hematocrit 32.6 (*)     MPV 8.6 (*)     Immature Granulocytes 1.2 (*)     Immature Grans (Abs) 0.13 (*)     Mono # 1.4 (*)     All other components within normal limits   COMPREHENSIVE METABOLIC PANEL - Abnormal; Notable for the following components:    CO2 21 (*)     BUN 24 (*)     Albumin 3.1 (*)     AST 64 (*)     ALT 76 (*)     All other components within normal limits    Narrative:     Add on TSH and MG per Dr. Lopez @ 03:44 am to order # [id number]   TROPONIN I - Abnormal; Notable for the following components:    Troponin I 0.529 (*)     All other components within normal limits    Narrative:     Add on TSH and MG per Dr. Lopez @ 03:44 am to order # [id number]   B-TYPE NATRIURETIC PEPTIDE - Abnormal; Notable for the following  components:     (*)     All other components within normal limits    Narrative:     Add on TSH and MG per Dr. Lopez @ 03:44 am to order # [id number]   TROPONIN ISTAT - Abnormal; Notable for the following components:    POC Cardiac Troponin I 0.21 (*)     All other components within normal limits   TSH - Abnormal; Notable for the following components:    TSH 5.187 (*)     All other components within normal limits    Narrative:     Add on TSH and MG per Dr. Lopez @ 03:44 am to order # [id number]   MAGNESIUM   TSH   MAGNESIUM    Narrative:     Add on TSH and MG per Dr. Lopez @ 03:44 am to order # [id number]   T4, FREE    Narrative:     Add on TSH and MG per Dr. Lopez @ 03:44 am to order # [id number]   TROPONIN I   POCT TROPONIN   POCT TROPONIN     Imaging Results               X-Ray Chest AP Portable (Final result)  Result time 04/05/24 05:21:10      Final result by Eitan Philip MD (04/05/24 05:21:10)                   Impression:      Portable chest x-ray:    A 13 mm nodular opacity projecting over the left mid to lower lung is favored to represent summation artifact, but a genuine pulmonary nodule or granuloma is not excluded.    Bibasilar pulmonary opacities, most likely subsegmental atelectasis.    For the above, recommendations include close clinical correlation and follow-up radiographs within 3 months, plus or minus chest CT if and when clinically appropriate.    Probable pulmonary emphysema.    Enlarged cardiopericardial silhouette.    Aortic valve replacement.    This report was flagged in Epic as abnormal.    DEVEN Philip MD, first observed the critical findings on 04/05/2024 at 05:17, and sent the results to Humphrey Daigle MD, via epic secure chat message on 04/05/2024 at 05:20.  Patient name and medical record number were specified/linked in the message. The messaging system provided confirmation that the message was immediately seen by the recipient.      Electronically signed by: Eitan  Cedrick  Date:    04/05/2024  Time:    05:21               Narrative:    EXAMINATION:  XR CHEST AP PORTABLE    CLINICAL HISTORY:  Unspecified atrial fibrillation    TECHNIQUE:  Single frontal view of the chest was performed.    COMPARISON:  None    FINDINGS:  Midline thoracic trachea, allowing for rightward rotation of the patient's torso.  Sternotomy wires, suboptimally visualized.    Mildly enlarged cardiopericardial silhouette.    Faintly visible cardiac valve prosthesis, likely aortic.    Relatively lucent appearance of the lungs, likely on the basis of underlying pulmonary emphysema.    A 13 mm irregularly-shaped nodular opacity projects over the left 5th anterior intercostal space and left 7th posterior intercostal space, lateral to the left midclavicular line.    Bandlike bilateral infrahilar opacities, left greater than right, most likely represent subsegmental atelectasis.    Otherwise, no consolidation, discrete pneumothorax, or blunting of either lateral costophrenic angle is demonstrated radiographically.    Degenerative changes in the suboptimally visualized spine, and in the AC joints bilaterally.    EKG leads project over the visualized torso.    No acute radiographic abnormality is demonstrated in the visualized upper abdomen.                                    EKG (independently interpreted by me): Rhythm:  AFib with RVR, rate of  127 BPM, no ST elevations or depressions, QTc 529    Imaging (independently interpreted by me):  Mild cardiomegaly    I decided to obtain the patient's medical records.  Summary of Medical Records:      Medications   metoprolol injection 5 mg (5 mg Intravenous Given 4/5/24 5426)   metoprolol tartrate (LOPRESSOR) tablet 50 mg (50 mg Oral Given 4/5/24 4001)     MDM:    68 y.o. male with A Fib with RVR in the setting of recent myomectomy and SAVR    Differential Dx:  Differential includes but is not limited to myocardial irritation, CHF, ACS less likely    ED  Management:  Cardiac workup started for an acute presentation of an emergent condition with multiple comorbidities. Metoprolol IV and PO administered for control of patients Afib with RVR. He has had a very labile heart rate while in the ED ranging from 90s to 150s. After administration of metoprolol patients heart rate has dropped into the low 100s. Initial troponin is elevated as expected in the setting of recent open heart surgery, will trend. Will admit patient to hospital medicine for further workup and treatment of his new onset Afib with RVR.    Discussed with: hospital medicine regarding admission    Medical Decision Making  Amount and/or Complexity of Data Reviewed  Labs: ordered. Decision-making details documented in ED Course.  Radiology: ordered.  ECG/medicine tests:  Decision-making details documented in ED Course.    Risk  Prescription drug management.         ED Course as of 04/05/24 0532   Fri Apr 05, 2024   0157 EKG 12-lead  No STEMI. [LP]   0414 POC Cardiac Troponin I(!): 0.21 [AW]   0415 Hemoglobin(!): 10.9 [AW]   0531 Troponin I(!): 0.529 [AW]      ED Course User Index  [AW] Harley Hawkins MD  [LP] Humphrey Daigle III, MD     Diagnostic Impression:    Final diagnoses:  [Z98.890] Post-operative state  [R07.9] Chest pain  [I48.91] A-fib               Harley Hawkins MD  Resident  04/05/24 0532

## 2024-04-05 NOTE — H&P
"[facility] - Emergency Dept  [facility] Medicine  History & Physical    Patient Name: Karthikeyan Pinon  MRN: [id number]  Patient Class: OP- Observation  Admission Date: 4/5/2024  Attending Physician: Hunter Hunt MD   Primary Care Provider: Kourtney Love MD         Patient information was obtained from patient, past medical records, and ER records.     Subjective:     Principal Problem:Atrial fibrillation    Chief Complaint:   Chief Complaint   Patient presents with    Post-op Problem     Reports having "cardiac procedure" 03/28/24. Reports BP 85/73 HR of 150 at home. Denies CP at this time        HPI: Karthikeyan Pinon is a 68 y.o. male with history of hypertrophic cardiomyopathy status post myectomy and aortic valve replacement on 3/28 presenting with chief complaint of postop problem.  Patient reports having rapid heart rate and episode of hypotension earlier today.  His heart rate has been varying from the 90s to 150s and he has been in AFib since arriving in the ED. He does not have a history of AFib.  He denies chest pain or shortness of breath, syncope.  He endorses transient lightheadedness that has since resolved     In ED: Afebrile. HR ranging from 90s to 150. Otherwise, VSS and satting well on RA. No leukocytosis. Hgb 10.9. CMP remarkable for BUN 24, albumin 3.1, AST 64 and ALT 76. . Trop 0.529. TSH 5.1, but T4 wnl. CXR with 13 mm nodular opacity (likely artifact, but nodule or granuloma not excluded), atelectasis, probably pulmonary emphysema, enlarged cardiopericardial silhouette, and AV replacement. Given IV metoprolol 5 mg x1 and po lopressor 50 mg. Placed in observation.     Past Medical History:   Diagnosis Date    Asbestos exposure 1975       Past Surgical History:   Procedure Laterality Date    STOVER MADELYN Right 7/20/2018    Procedure: STOVER-MADELYN PROCEDURE;  Surgeon: Tiago Segura MD;  Location: [facility] OR [address] [place] [address];  Service: ENT;  Laterality: Right;       Review of patient's allergies " indicates:  No Known Allergies    No current facility-administered medications on file prior to encounter.     Current Outpatient Medications on File Prior to Encounter   Medication Sig    atorvastatin (LIPITOR) 10 MG tablet Take 10 mg by mouth every evening.    furosemide (LASIX) 20 MG tablet Take 20 mg by mouth once daily.    magnesium oxide (MAG-OX) 400 mg (241.3 mg magnesium) tablet Take 400 mg by mouth once daily.    metoprolol succinate (TOPROL-XL) 50 MG 24 hr tablet Take 50 mg by mouth once daily.    multivitamin-iron-folic acid Tab Take 1 tablet by mouth once daily.    oxyCODONE (ROXICODONE) 5 MG immediate release tablet Take 5 mg by mouth every 8 (eight) hours as needed for Pain.    pantoprazole (PROTONIX) 20 MG tablet Take 20 mg by mouth once daily.    potassium chloride (KLOR-CON) 10 MEQ TbSR Take 10 mEq by mouth once daily.    acetaminophen (TYLENOL) 500 MG tablet Take 2 tablets (1,000 mg total) by mouth every 6 (six) hours as needed for Pain.    aspirin (ECOTRIN) 81 MG EC tablet Take 81 mg by mouth once daily.     dextroamphetamine-amphetamine (ADDERALL XR) 20 MG 24 hr capsule Take 20 mg by mouth every morning.    fish oil-omega-3 fatty acids 300-1,000 mg capsule Take by mouth once daily.    hydroCHLOROthiazide (HYDRODIURIL) 25 MG tablet Take 25 mg by mouth once daily.    ibuprofen (ADVIL,MOTRIN) 800 MG tablet Take 1 tablet (800 mg total) by mouth every 6 (six) hours as needed for Pain.    mecobalamin, vitamin B12, 1,000 mcg Chew Take by mouth.    multivitamin (ONE DAILY MULTIVITAMIN) per tablet Take 1 tablet by mouth once daily.    zinc gluconate 50 mg tablet Take 25 mg by mouth once daily.     Family History       Problem Relation (Age of Onset)    Atrial fibrillation Mother    Diabetes Mother    Heart failure Mother, Sister, Maternal Grandmother    No Known Problems Father, Brother, Maternal Grandfather, Paternal Grandmother, Paternal Grandfather          Tobacco Use    Smoking status: Former      Current packs/day: 0.00     Types: Cigarettes     Quit date: 2006     Years since quittin.2    Smokeless tobacco: Never   Substance and Sexual Activity    Alcohol use: Not on file    Drug use: Not on file    Sexual activity: Not on file     Review of Systems   Constitutional:  Negative for activity change, chills and fever.   HENT:  Negative for trouble swallowing.    Eyes:  Negative for photophobia and visual disturbance.   Respiratory:  Negative for chest tightness, shortness of breath and wheezing.    Cardiovascular:  Positive for palpitations. Negative for chest pain and leg swelling.   Gastrointestinal:  Negative for abdominal pain, constipation, diarrhea, nausea and vomiting.   Genitourinary:  Negative for dysuria, frequency, hematuria and urgency.   Musculoskeletal:  Negative for arthralgias, back pain and gait problem.   Skin:  Negative for color change and rash.   Neurological:  Negative for dizziness, syncope, weakness, light-headedness, numbness and headaches.   Psychiatric/Behavioral:  Negative for agitation and confusion. The patient is not nervous/anxious.      Objective:     Vital Signs (Most Recent):  Temp: 98.9 °F (37.2 °C) (24 0150)  Pulse: (!) 128 (24)  Resp: 16 (24)  BP: 110/78 (24)  SpO2: 95 % (24) Vital Signs (24h Range):  Temp:  [98.9 °F (37.2 °C)] 98.9 °F (37.2 °C)  Pulse:  [] 128  Resp:  [11-20] 16  SpO2:  [95 %-97 %] 95 %  BP: (107-150)/(57-83) 110/78     Weight: 92.1 kg (203 lb)  Body mass index is 27.53 kg/m².     Physical Exam  Vitals and nursing note reviewed.   Constitutional:       General: He is not in acute distress.     Appearance: He is well-developed.   HENT:      Head: Normocephalic and atraumatic.      Mouth/Throat:      Pharynx: No oropharyngeal exudate.   Eyes:      Conjunctiva/sclera: Conjunctivae normal.      Pupils: Pupils are equal, round, and reactive to light.   Cardiovascular:      Rate and Rhythm: Rhythm  irregular.      Heart sounds: Normal heart sounds.   Pulmonary:      Effort: Pulmonary effort is normal. No respiratory distress.      Breath sounds: Normal breath sounds. No wheezing.   Abdominal:      General: Bowel sounds are normal. There is no distension.      Palpations: Abdomen is soft.      Tenderness: There is no abdominal tenderness.   Musculoskeletal:         General: No tenderness. Normal range of motion.      Cervical back: Normal range of motion and neck supple.   Lymphadenopathy:      Cervical: No cervical adenopathy.   Skin:     General: Skin is warm and dry.      Capillary Refill: Capillary refill takes less than 2 seconds.      Findings: No rash.   Neurological:      Mental Status: He is alert and oriented to person, place, and time.      Cranial Nerves: No cranial nerve deficit.      Sensory: No sensory deficit.      Coordination: Coordination normal.   Psychiatric:         Behavior: Behavior normal.         Thought Content: Thought content normal.         Judgment: Judgment normal.              CRANIAL NERVES     CN III, IV, VI   Pupils are equal, round, and reactive to light.       Significant Labs: All pertinent labs within the past 24 hours have been reviewed.  CBC:   Recent Labs   Lab 04/05/24 0328   WBC 10.45   HGB 10.9*   HCT 32.6*        CMP:   Recent Labs   Lab 04/05/24 0328      K 4.0      CO2 21*      BUN 24*   CREATININE 1.0   CALCIUM 9.5   PROT 6.5   ALBUMIN 3.1*   BILITOT 0.4   ALKPHOS 131   AST 64*   ALT 76*   ANIONGAP 8     Cardiac Markers:   Recent Labs   Lab 04/05/24 0328   *     Troponin:   Recent Labs   Lab 04/05/24 0328   TROPONINI 0.529*     TSH:   Recent Labs   Lab 04/05/24 0328   TSH 5.187*       Significant Imaging: I have reviewed all pertinent imaging results/findings within the past 24 hours.  Imaging Results               X-Ray Chest AP Portable (Final result)  Result time 04/05/24 05:21:10      Final result by Eitan Philip MD  (04/05/24 05:21:10)                   Impression:      Portable chest x-ray:    A 13 mm nodular opacity projecting over the left mid to lower lung is favored to represent summation artifact, but a genuine pulmonary nodule or granuloma is not excluded.    Bibasilar pulmonary opacities, most likely subsegmental atelectasis.    For the above, recommendations include close clinical correlation and follow-up radiographs within 3 months, plus or minus chest CT if and when clinically appropriate.    Probable pulmonary emphysema.    Enlarged cardiopericardial silhouette.    Aortic valve replacement.    This report was flagged in Epic as abnormal.    DEVEN Philip MD, first observed the critical findings on 04/05/2024 at 05:17, and sent the results to Humphrey Daigle MD, via epic secure chat message on 04/05/2024 at 05:20.  Patient name and medical record number were specified/linked in the message. The messaging system provided confirmation that the message was immediately seen by the recipient.      Electronically signed by: Eitan Philip  Date:    04/05/2024  Time:    05:21               Narrative:    EXAMINATION:  XR CHEST AP PORTABLE    CLINICAL HISTORY:  Unspecified atrial fibrillation    TECHNIQUE:  Single frontal view of the chest was performed.    COMPARISON:  None    FINDINGS:  Midline thoracic trachea, allowing for rightward rotation of the patient's torso.  Sternotomy wires, suboptimally visualized.    Mildly enlarged cardiopericardial silhouette.    Faintly visible cardiac valve prosthesis, likely aortic.    Relatively lucent appearance of the lungs, likely on the basis of underlying pulmonary emphysema.    A 13 mm irregularly-shaped nodular opacity projects over the left 5th anterior intercostal space and left 7th posterior intercostal space, lateral to the left midclavicular line.    Bandlike bilateral infrahilar opacities, left greater than right, most likely represent subsegmental atelectasis.    Otherwise,  no consolidation, discrete pneumothorax, or blunting of either lateral costophrenic angle is demonstrated radiographically.    Degenerative changes in the suboptimally visualized spine, and in the AC joints bilaterally.    EKG leads project over the visualized torso.    No acute radiographic abnormality is demonstrated in the visualized upper abdomen.                                    Assessment/Plan:     * Atrial fibrillation  Patient with Paroxysmal (<7 days) atrial fibrillation which is uncontrolled currently with Beta Blocker. Patient is currently in atrial fibrillation.[id number] Score: 1.  Anticoagulation not indicated due to [id number] of 1  - CXR without consolidation, UA pending  - electrolytes wnl  - likely triggered by recent procedure   - Cardiology consulted for new onset A fib; appreciate recs  - NPO   - monitor tele     Subclinical hypothyroidism  - TSH 5.18, but T4 wnl  - follow up with PCP and repeat labs OP    Transaminitis  - AST 64 and ALT 76 on admit; unknown baseline  - denies abdominal pain, n/v/  - hepatitis panel pending   - hold statin  - daily cmp    Elevated troponin  - likely from recent myectomy and aortic valve replacement on 3/28, but unknown baseline. Will continue to trend q6h    Primary hypertension  - reports episode of hypotension PTA. Normotensive in ED  - will continue home metoprolol  - hold lasix and HCTZ. Monitor BP closely and will restart when clinically warranted    HOCM (hypertrophic obstructive cardiomyopathy)  S/p septal myectomy and AV replacement on 03/28    HLD (hyperlipidemia)  - hold statin in setting of transaminitis       VTE Risk Mitigation (From admission, onward)           Ordered     IP VTE LOW RISK PATIENT  Once         04/05/24 0632                         On 04/05/2024, patient should be placed in hospital observation services under my care in collaboration with Dr. Hunter Hunt.           Jeffrey Shankar PA-C  Department of Hospital Medicine  [facility] - Emergency Dept

## 2024-04-05 NOTE — ASSESSMENT & PLAN NOTE
- AST 64 and ALT 76 on admit; unknown baseline  - denies abdominal pain, n/v/  - hepatitis panel pending   - hold statin  - daily cmp   The medication was DENIED; OTC PRODUCTS ARE A PLAN EXCLUSION. DENIAL letter uploaded to MED[employer]. If you want an APPEAL; please note in this encounter what new information you would like to APPEAL with. Once complete route back to PA POOL. If this requires a response please respond to the pool ( [phone]). Thank you please advise patient.

## 2024-04-05 NOTE — PROVIDER PROGRESS NOTES - EMERGENCY DEPT.
Encounter Date: 4/5/2024    ED Physician Progress Notes        ED Resident HAND-OFF NOTE:  Karthikeyan Pinon is a 68 y.o. male who presented to the ED on 4/5/2024, patient C/O postop problem. I assumed care of patient from off-going ED physician team patient pending admission to .    On my evaluation, Karthikeyan Pinon appears well, hemodynamically stable and in NAD. Thus far, Karthikeyan Pinon has received:  Medications   metoprolol injection 5 mg (5 mg Intravenous Given 4/5/24 0346)   metoprolol tartrate (LOPRESSOR) tablet 50 mg (50 mg Oral Given 4/5/24 4259)       /72   Pulse 107   Temp 98.9 °F (37.2 °C) (Oral)   Resp 14   Ht 6' (1.829 m)   Wt 92.1 kg (203 lb)   SpO2 97%   BMI 27.53 kg/m²         Disposition: I anticipate patient will be admitted.  ______________________  Glen Simms MD   Emergency Medicine Resident      UPDATE:         :  Post-operative state  Chest pain  A-fib

## 2024-04-05 NOTE — PHARMACY MED REC
"Admission Medication History     The home medication history was taken by Barb Gonzalez.    You may go to "Admission" then "Reconcile Home Medications" tabs to review and/or act upon these items.     The home medication list has been updated by the Pharmacy department.   Please read ALL comments highlighted in yellow.   Please address this information as you see fit.    Feel free to contact us if you have any questions or require assistance.      The medications listed below were removed from the home medication list. Please reorder if appropriate:  Patient reports no longer taking the following medication(s):  ACETAMINOPHEN 500 MG  DEXTROAMPHETAMINE-AMPHETAMINE 20 MG 24 HR  FISH OIL-OMEGA-3 FATTY ACIDS 300-1000 MG  FUROSEMIDE 20 MG  HYDROCHLOROTHIAZIDE 25 MG  IBUPROFEN 800 MG  MECOBALAMIN, 1,000 MCG CHEW  MULTIVITAMIN TAB  ZINC GLUCONATE 50 MG    Medications listed below were obtained from: Patient/family and Analytic software- bttn  Current Outpatient Medications on File Prior to Encounter   Medication Sig    aspirin 81 MG Chew   Take 81 mg by mouth once daily.    atorvastatin (LIPITOR) 10 MG tablet   Take 10 mg by mouth every evening.    magnesium oxide (MAG-OX) 400 mg (241.3 mg   magnesium) tablet   Take 400 mg by mouth once daily.    metoprolol succinate (TOPROL-XL) 50 MG 24 hr tablet   Take 25 mg by mouth 2 (two) times daily.    pantoprazole (PROTONIX) 20 MG tablet   Take 20 mg by mouth once daily.    potassium chloride (KLOR-CON) 10 MEQ TbSR Take 10 mEq by mouth once daily.     Potential issues to be addressed PRIOR TO DISCHARGE  Patient reported not taking the following medications: (ROXICODONE). These medications remain on the home medication list. Please address accordingly.           Barb Gonzalez  EXT [phone]                  .          "

## 2024-04-05 NOTE — ASSESSMENT & PLAN NOTE
Patient with Paroxysmal (<7 days) atrial fibrillation which is uncontrolled currently with Beta Blocker. Patient is currently in atrial fibrillation.[id number] Score: 1.  Anticoagulation not indicated due to [id number] of 1  - CXR without consolidation, UA pending  - electrolytes wnl  - likely triggered by recent procedure   - Cardiology consulted for new onset A fib; appreciate recs  - NPO   - monitor tele

## 2024-04-05 NOTE — HPI
68 y.o. male with history of hypertrophic cardiomyopathy status post myomectomy and aortic valve replacement on 3/28 consulted for paroxysmal SVT s/p recent myectomy.     Patient presented with palpitations with a heart rate of the 150s.  He states that it was last a couple of seconds then resolves on its own.  He called a [facility] nurse who advised him to go to the ED. He reported of a long history of palpitations in the past however was recently evaluated approximately 5 months ago for this. Patient is a patient of KIMMIE Vidal, at [facility] who recommended ICD placement.  Prior to that he was complaining presyncopal episodes during exertion.  He was found to have HOCM and was seen at the [facility] for a septal myomectomy an aortic valve replaced. Procedure done by Lee Izaguirre MD, S/P on 3/28/2024: Aortic Valve Replacement (Inspiris #25), Septal myectomy (16.5 Gr). Last saw Cardiology at [facility] on 4/2 for a pos-op follow up.  He recently returned 3 days ago.  No worsening symptoms.  Denies dyspnea exertion, leg swelling, shortness for breath.  He is compliant with his home medications and weighs himself daily with no weight gain    While in the ED, patient received 5 of metoprolol IV and started on home PO of Toprol 50 mg.     Patient vitals tachycardic 122, normotensive systolic 100 to 130 satting 97%.  ED workup 10 CMP was stable with. Elevated BNP of 917, troponin peaked at 0.529 downtrending to 0.395. TSH elevated at 5.1, but normal T4. Chest x-ray revealing bibasilar pulmonary opacities, most likely subsegmental atelectasis. Enlarged cardiopericardial silhouette. Aortic valve replacement.     Initial EKG: Sinus rhythm with rate of 96, 215 patient developed afib w/ RVR. 339 patient had wide complex tachycardia.

## 2024-04-05 NOTE — ED TRIAGE NOTES
"Karthikeyan Pinon, a 68 y.o. male presents to the ED w/ complaint of low blood pressure and heart rate in the 150's. Patient states he had a valve replacement in march at the [facility] in [place]. Patient denies C/P, SOB, N/V/D.     Triage note:  Chief Complaint   Patient presents with    Post-op Problem     Reports having "cardiac procedure" 03/28/24. Reports BP 85/73 HR of 150 at home. Denies CP at this time     Review of patient's allergies indicates:  No Known Allergies  Past Medical History:   Diagnosis Date    Asbestos exposure 1975       "

## 2024-04-05 NOTE — SUBJECTIVE & OBJECTIVE
Past Medical History:   Diagnosis Date    Asbestos exposure 1975       Past Surgical History:   Procedure Laterality Date    STOVER MADELYN Right 7/20/2018    Procedure: STOVER-MADELYN PROCEDURE;  Surgeon: Tiago Segura MD;  Location: [facility] OR [address] [place] [address];  Service: ENT;  Laterality: Right;       Review of patient's allergies indicates:  No Known Allergies    No current facility-administered medications on file prior to encounter.     Current Outpatient Medications on File Prior to Encounter   Medication Sig    atorvastatin (LIPITOR) 10 MG tablet Take 10 mg by mouth every evening.    furosemide (LASIX) 20 MG tablet Take 20 mg by mouth once daily.    magnesium oxide (MAG-OX) 400 mg (241.3 mg magnesium) tablet Take 400 mg by mouth once daily.    metoprolol succinate (TOPROL-XL) 50 MG 24 hr tablet Take 50 mg by mouth once daily.    multivitamin-iron-folic acid Tab Take 1 tablet by mouth once daily.    oxyCODONE (ROXICODONE) 5 MG immediate release tablet Take 5 mg by mouth every 8 (eight) hours as needed for Pain.    pantoprazole (PROTONIX) 20 MG tablet Take 20 mg by mouth once daily.    potassium chloride (KLOR-CON) 10 MEQ TbSR Take 10 mEq by mouth once daily.    acetaminophen (TYLENOL) 500 MG tablet Take 2 tablets (1,000 mg total) by mouth every 6 (six) hours as needed for Pain.    aspirin (ECOTRIN) 81 MG EC tablet Take 81 mg by mouth once daily.     dextroamphetamine-amphetamine (ADDERALL XR) 20 MG 24 hr capsule Take 20 mg by mouth every morning.    fish oil-omega-3 fatty acids 300-1,000 mg capsule Take by mouth once daily.    hydroCHLOROthiazide (HYDRODIURIL) 25 MG tablet Take 25 mg by mouth once daily.    ibuprofen (ADVIL,MOTRIN) 800 MG tablet Take 1 tablet (800 mg total) by mouth every 6 (six) hours as needed for Pain.    mecobalamin, vitamin B12, 1,000 mcg Chew Take by mouth.    multivitamin (ONE DAILY MULTIVITAMIN) per tablet Take 1 tablet by mouth once daily.    zinc gluconate 50 mg tablet Take 25 mg by mouth once  daily.     Family History       Problem Relation (Age of Onset)    Atrial fibrillation Mother    Diabetes Mother    Heart failure Mother, Sister, Maternal Grandmother    No Known Problems Father, Brother, Maternal Grandfather, Paternal Grandmother, Paternal Grandfather          Tobacco Use    Smoking status: Former     Current packs/day: 0.00     Types: Cigarettes     Quit date:      Years since quittin.2    Smokeless tobacco: Never   Substance and Sexual Activity    Alcohol use: Not on file    Drug use: Not on file    Sexual activity: Not on file     Review of Systems   Cardiovascular:  Positive for palpitations.     Objective:     Vital Signs (Most Recent):  Temp: 98.7 °F (37.1 °C) (24 1206)  Pulse: 90 (24 1206)  Resp: 17 (24 1206)  BP: 110/72 (24 1206)  SpO2: 97 % (24 1206) Vital Signs (24h Range):  Temp:  [98.2 °F (36.8 °C)-98.9 °F (37.2 °C)] 98.7 °F (37.1 °C)  Pulse:  [] 90  Resp:  [11-20] 17  SpO2:  [95 %-97 %] 97 %  BP: (107-150)/(57-83) 110/72     Weight: 92.1 kg (203 lb)  Body mass index is 27.53 kg/m².    SpO2: 97 %       No intake or output data in the 24 hours ending 24 1329    Lines/Drains/Airways       Peripheral Intravenous Line  Duration                  Peripheral IV - Single Lumen 24 0325 20 G Left Wrist <1 day                     Physical Exam  Vitals and nursing note reviewed.   HENT:      Head: Normocephalic and atraumatic.      Nose: Nose normal.   Eyes:      General: No scleral icterus.  Cardiovascular:      Rate and Rhythm: Normal rate. Rhythm irregular.   Pulmonary:      Effort: Pulmonary effort is normal.   Abdominal:      Palpations: Abdomen is soft.   Musculoskeletal:         General: Normal range of motion.      Right lower leg: No edema.      Left lower leg: No edema.   Skin:     General: Skin is warm.   Neurological:      General: No focal deficit present.      Mental Status: He is alert. Mental status is at baseline.           Significant Labs: BMP:   Recent Labs   Lab 04/05/24  0328         K 4.0      CO2 21*   BUN 24*   CREATININE 1.0   CALCIUM 9.5   MG 2.2    and CMP   Recent Labs   Lab 04/05/24  0328      K 4.0      CO2 21*      BUN 24*   CREATININE 1.0   CALCIUM 9.5   PROT 6.5   ALBUMIN 3.1*   BILITOT 0.4   ALKPHOS 131   AST 64*   ALT 76*   ANIONGAP 8       Significant Imaging:

## 2024-04-05 NOTE — CONSULTS
[facility] Emergency Dept  Cardiology  Consult Note    Patient Name: Karthikeyan Pinon  MRN: [id number]  Admission Date: 4/5/2024  Hospital Length of Stay: 0 days  Code Status: Full Code   Attending Provider: Hunter Hunt MD   Consulting Provider: Drake Correa DO  Primary Care Physician: Kourtney Love MD  Principal Problem:Atrial fibrillation    Patient information was obtained from patient and ER records.     Inpatient consult to Cardiology  Consult performed by: Drake Correa DO  Consult ordered by: Jennifer Delacruz MD        Subjective:     Chief Complaint:  SVT s/p myomectomy      HPI:   68 y.o. male with history of hypertrophic cardiomyopathy status post myomectomy and aortic valve replacement on 3/28 consulted for paroxysmal SVT s/p recent myectomy.     Patient presented with palpitations with a heart rate of the 150s.  He states that it was last a couple of seconds then resolves on its own.  He called a [facility] nurse who advised him to go to the ED. He reported of a long history of palpitations in the past however was recently evaluated approximately 5 months ago for this. Patient is a patient of KIMMIE Vidal, at [facility] who recommended ICD placement.  Prior to that he was complaining presyncopal episodes during exertion.  He was found to have HOCM and was seen at the [facility] for a septal myomectomy an aortic valve replaced. Procedure done by Lee Izaguirre MD, S/P on 3/28/2024: Aortic Valve Replacement (Inspiris #25), Septal myectomy (16.5 Gr). Last saw Cardiology at [facility] on 4/2 for a pos-op follow up.  He recently returned 3 days ago.  No worsening symptoms.  Denies dyspnea exertion, leg swelling, shortness for breath.  He is compliant with his home medications and weighs himself daily with no weight gain    While in the ED, patient received 5 of metoprolol IV and started on home PO of Toprol 50 mg.     Patient vitals tachycardic 122, normotensive systolic 100  to 130 satting 97%.  ED workup 10 CMP was stable with. Elevated BNP of 917, troponin peaked at 0.529 downtrending to 0.395. TSH elevated at 5.1, but normal T4. Chest x-ray revealing bibasilar pulmonary opacities, most likely subsegmental atelectasis. Enlarged cardiopericardial silhouette. Aortic valve replacement.     Initial EKG: Sinus rhythm with rate of 96, 215 patient developed afib w/ RVR. 339 patient had wide complex tachycardia.           Past Medical History:   Diagnosis Date    Asbestos exposure 1975       Past Surgical History:   Procedure Laterality Date    STOVER MADELYN Right 7/20/2018    Procedure: STOVER-MADELYN PROCEDURE;  Surgeon: Tiago Segura MD;  Location: [facility] OR [address] [place] [address];  Service: ENT;  Laterality: Right;       Review of patient's allergies indicates:  No Known Allergies    No current facility-administered medications on file prior to encounter.     Current Outpatient Medications on File Prior to Encounter   Medication Sig    atorvastatin (LIPITOR) 10 MG tablet Take 10 mg by mouth every evening.    furosemide (LASIX) 20 MG tablet Take 20 mg by mouth once daily.    magnesium oxide (MAG-OX) 400 mg (241.3 mg magnesium) tablet Take 400 mg by mouth once daily.    metoprolol succinate (TOPROL-XL) 50 MG 24 hr tablet Take 50 mg by mouth once daily.    multivitamin-iron-folic acid Tab Take 1 tablet by mouth once daily.    oxyCODONE (ROXICODONE) 5 MG immediate release tablet Take 5 mg by mouth every 8 (eight) hours as needed for Pain.    pantoprazole (PROTONIX) 20 MG tablet Take 20 mg by mouth once daily.    potassium chloride (KLOR-CON) 10 MEQ TbSR Take 10 mEq by mouth once daily.    acetaminophen (TYLENOL) 500 MG tablet Take 2 tablets (1,000 mg total) by mouth every 6 (six) hours as needed for Pain.    aspirin (ECOTRIN) 81 MG EC tablet Take 81 mg by mouth once daily.     dextroamphetamine-amphetamine (ADDERALL XR) 20 MG 24 hr capsule Take 20 mg by mouth every morning.    fish oil-omega-3 fatty acids  300-1,000 mg capsule Take by mouth once daily.    hydroCHLOROthiazide (HYDRODIURIL) 25 MG tablet Take 25 mg by mouth once daily.    ibuprofen (ADVIL,MOTRIN) 800 MG tablet Take 1 tablet (800 mg total) by mouth every 6 (six) hours as needed for Pain.    mecobalamin, vitamin B12, 1,000 mcg Chew Take by mouth.    multivitamin (ONE DAILY MULTIVITAMIN) per tablet Take 1 tablet by mouth once daily.    zinc gluconate 50 mg tablet Take 25 mg by mouth once daily.     Family History       Problem Relation (Age of Onset)    Atrial fibrillation Mother    Diabetes Mother    Heart failure Mother, Sister, Maternal Grandmother    No Known Problems Father, Brother, Maternal Grandfather, Paternal Grandmother, Paternal Grandfather          Tobacco Use    Smoking status: Former     Current packs/day: 0.00     Types: Cigarettes     Quit date:      Years since quittin.2    Smokeless tobacco: Never   Substance and Sexual Activity    Alcohol use: Not on file    Drug use: Not on file    Sexual activity: Not on file     Review of Systems   Cardiovascular:  Positive for palpitations.     Objective:     Vital Signs (Most Recent):  Temp: 98.7 °F (37.1 °C) (24 1206)  Pulse: 90 (24 1206)  Resp: 17 (24 1206)  BP: 110/72 (24 1206)  SpO2: 97 % (24 1206) Vital Signs (24h Range):  Temp:  [98.2 °F (36.8 °C)-98.9 °F (37.2 °C)] 98.7 °F (37.1 °C)  Pulse:  [] 90  Resp:  [11-20] 17  SpO2:  [95 %-97 %] 97 %  BP: (107-150)/(57-83) 110/72     Weight: 92.1 kg (203 lb)  Body mass index is 27.53 kg/m².    SpO2: 97 %       No intake or output data in the 24 hours ending 24 1329    Lines/Drains/Airways       Peripheral Intravenous Line  Duration                  Peripheral IV - Single Lumen 24 0325 20 G Left Wrist <1 day                     Physical Exam  Vitals and nursing note reviewed.   HENT:      Head: Normocephalic and atraumatic.      Nose: Nose normal.   Eyes:      General: No scleral  icterus.  Cardiovascular:      Rate and Rhythm: Normal rate. Rhythm irregular.   Pulmonary:      Effort: Pulmonary effort is normal.   Abdominal:      Palpations: Abdomen is soft.   Musculoskeletal:         General: Normal range of motion.      Right lower leg: No edema.      Left lower leg: No edema.   Skin:     General: Skin is warm.   Neurological:      General: No focal deficit present.      Mental Status: He is alert. Mental status is at baseline.          Significant Labs: BMP:   Recent Labs   Lab 04/05/24  0328         K 4.0      CO2 21*   BUN 24*   CREATININE 1.0   CALCIUM 9.5   MG 2.2    and CMP   Recent Labs   Lab 04/05/24  0328      K 4.0      CO2 21*      BUN 24*   CREATININE 1.0   CALCIUM 9.5   PROT 6.5   ALBUMIN 3.1*   BILITOT 0.4   ALKPHOS 131   AST 64*   ALT 76*   ANIONGAP 8       Significant Imaging:   Assessment and Plan:     * Atrial fibrillation  69 y/o M with history of HOCM s/p septal myomectomy on 3/28 presenting here with atrial fibrillation, now converted back to sinus after IV metoprolol. Upon chart review, there were concerns for post op AF  vs ST with PACs at [facility]. He was rate controlled at time of discharge. Patient has significant history of palpitations prior in the past that worsened within the past couple of months. He was recently seen EP in 5 months ago for evaluation and found to have HCOM. Etiology likely 2/2 to HCOM vs post op afib. Patient will need to be started on anticoagulation therapy until further evaluation by EP.     Recommendations:  - continue toprol-xl 50 mg  - start Eliquis 5 mg  - F/u ECHO  - 30 day event monitor at discharge   - EP referral for follow up        VTE Risk Mitigation (From admission, onward)           Ordered     IP VTE LOW RISK PATIENT  Once         04/05/24 0632                    Thank you for your consult. I will follow-up with patient. Please contact us if you have any additional  questions.    Drake Correa,   Cardiology   [facility]

## 2024-04-05 NOTE — SUBJECTIVE & OBJECTIVE
Past Medical History:   Diagnosis Date    Asbestos exposure 1975       Past Surgical History:   Procedure Laterality Date    STOVER MADELYN Right 7/20/2018    Procedure: STOVER-MADELYN PROCEDURE;  Surgeon: Tiago Segura MD;  Location: [facility] OR [address] [place] [address];  Service: ENT;  Laterality: Right;       Review of patient's allergies indicates:  No Known Allergies    No current facility-administered medications on file prior to encounter.     Current Outpatient Medications on File Prior to Encounter   Medication Sig    atorvastatin (LIPITOR) 10 MG tablet Take 10 mg by mouth every evening.    furosemide (LASIX) 20 MG tablet Take 20 mg by mouth once daily.    magnesium oxide (MAG-OX) 400 mg (241.3 mg magnesium) tablet Take 400 mg by mouth once daily.    metoprolol succinate (TOPROL-XL) 50 MG 24 hr tablet Take 50 mg by mouth once daily.    multivitamin-iron-folic acid Tab Take 1 tablet by mouth once daily.    oxyCODONE (ROXICODONE) 5 MG immediate release tablet Take 5 mg by mouth every 8 (eight) hours as needed for Pain.    pantoprazole (PROTONIX) 20 MG tablet Take 20 mg by mouth once daily.    potassium chloride (KLOR-CON) 10 MEQ TbSR Take 10 mEq by mouth once daily.    acetaminophen (TYLENOL) 500 MG tablet Take 2 tablets (1,000 mg total) by mouth every 6 (six) hours as needed for Pain.    aspirin (ECOTRIN) 81 MG EC tablet Take 81 mg by mouth once daily.     dextroamphetamine-amphetamine (ADDERALL XR) 20 MG 24 hr capsule Take 20 mg by mouth every morning.    fish oil-omega-3 fatty acids 300-1,000 mg capsule Take by mouth once daily.    hydroCHLOROthiazide (HYDRODIURIL) 25 MG tablet Take 25 mg by mouth once daily.    ibuprofen (ADVIL,MOTRIN) 800 MG tablet Take 1 tablet (800 mg total) by mouth every 6 (six) hours as needed for Pain.    mecobalamin, vitamin B12, 1,000 mcg Chew Take by mouth.    multivitamin (ONE DAILY MULTIVITAMIN) per tablet Take 1 tablet by mouth once daily.    zinc gluconate 50 mg tablet Take 25 mg by mouth once  daily.     Family History       Problem Relation (Age of Onset)    Atrial fibrillation Mother    Diabetes Mother    Heart failure Mother, Sister, Maternal Grandmother    No Known Problems Father, Brother, Maternal Grandfather, Paternal Grandmother, Paternal Grandfather          Tobacco Use    Smoking status: Former     Current packs/day: 0.00     Types: Cigarettes     Quit date:      Years since quittin.2    Smokeless tobacco: Never   Substance and Sexual Activity    Alcohol use: Not on file    Drug use: Not on file    Sexual activity: Not on file     Review of Systems   Constitutional:  Negative for activity change, chills and fever.   HENT:  Negative for trouble swallowing.    Eyes:  Negative for photophobia and visual disturbance.   Respiratory:  Negative for chest tightness, shortness of breath and wheezing.    Cardiovascular:  Positive for palpitations. Negative for chest pain and leg swelling.   Gastrointestinal:  Negative for abdominal pain, constipation, diarrhea, nausea and vomiting.   Genitourinary:  Negative for dysuria, frequency, hematuria and urgency.   Musculoskeletal:  Negative for arthralgias, back pain and gait problem.   Skin:  Negative for color change and rash.   Neurological:  Negative for dizziness, syncope, weakness, light-headedness, numbness and headaches.   Psychiatric/Behavioral:  Negative for agitation and confusion. The patient is not nervous/anxious.      Objective:     Vital Signs (Most Recent):  Temp: 98.9 °F (37.2 °C) (24 0150)  Pulse: (!) 128 (24)  Resp: 16 (24)  BP: 110/78 (24)  SpO2: 95 % (24) Vital Signs (24h Range):  Temp:  [98.9 °F (37.2 °C)] 98.9 °F (37.2 °C)  Pulse:  [] 128  Resp:  [11-20] 16  SpO2:  [95 %-97 %] 95 %  BP: (107-150)/(57-83) 110/78     Weight: 92.1 kg (203 lb)  Body mass index is 27.53 kg/m².     Physical Exam  Vitals and nursing note reviewed.   Constitutional:       General: He is not in acute  distress.     Appearance: He is well-developed.   HENT:      Head: Normocephalic and atraumatic.      Mouth/Throat:      Pharynx: No oropharyngeal exudate.   Eyes:      Conjunctiva/sclera: Conjunctivae normal.      Pupils: Pupils are equal, round, and reactive to light.   Cardiovascular:      Rate and Rhythm: Rhythm irregular.      Heart sounds: Normal heart sounds.   Pulmonary:      Effort: Pulmonary effort is normal. No respiratory distress.      Breath sounds: Normal breath sounds. No wheezing.   Abdominal:      General: Bowel sounds are normal. There is no distension.      Palpations: Abdomen is soft.      Tenderness: There is no abdominal tenderness.   Musculoskeletal:         General: No tenderness. Normal range of motion.      Cervical back: Normal range of motion and neck supple.   Lymphadenopathy:      Cervical: No cervical adenopathy.   Skin:     General: Skin is warm and dry.      Capillary Refill: Capillary refill takes less than 2 seconds.      Findings: No rash.   Neurological:      Mental Status: He is alert and oriented to person, place, and time.      Cranial Nerves: No cranial nerve deficit.      Sensory: No sensory deficit.      Coordination: Coordination normal.   Psychiatric:         Behavior: Behavior normal.         Thought Content: Thought content normal.         Judgment: Judgment normal.              CRANIAL NERVES     CN III, IV, VI   Pupils are equal, round, and reactive to light.       Significant Labs: All pertinent labs within the past 24 hours have been reviewed.  CBC:   Recent Labs   Lab 04/05/24 0328   WBC 10.45   HGB 10.9*   HCT 32.6*        CMP:   Recent Labs   Lab 04/05/24 0328      K 4.0      CO2 21*      BUN 24*   CREATININE 1.0   CALCIUM 9.5   PROT 6.5   ALBUMIN 3.1*   BILITOT 0.4   ALKPHOS 131   AST 64*   ALT 76*   ANIONGAP 8     Cardiac Markers:   Recent Labs   Lab 04/05/24 0328   *     Troponin:   Recent Labs   Lab 04/05/24 0328    TROPONINI 0.529*     TSH:   Recent Labs   Lab 04/05/24  0328   TSH 5.187*       Significant Imaging: I have reviewed all pertinent imaging results/findings within the past 24 hours.  Imaging Results               X-Ray Chest AP Portable (Final result)  Result time 04/05/24 05:21:10      Final result by Eitan Philip MD (04/05/24 05:21:10)                   Impression:      Portable chest x-ray:    A 13 mm nodular opacity projecting over the left mid to lower lung is favored to represent summation artifact, but a genuine pulmonary nodule or granuloma is not excluded.    Bibasilar pulmonary opacities, most likely subsegmental atelectasis.    For the above, recommendations include close clinical correlation and follow-up radiographs within 3 months, plus or minus chest CT if and when clinically appropriate.    Probable pulmonary emphysema.    Enlarged cardiopericardial silhouette.    Aortic valve replacement.    This report was flagged in Epic as abnormal.    DEVEN Philip MD, first observed the critical findings on 04/05/2024 at 05:17, and sent the results to Humphrey Daigle MD, via epic secure chat message on 04/05/2024 at 05:20.  Patient name and medical record number were specified/linked in the message. The messaging system provided confirmation that the message was immediately seen by the recipient.      Electronically signed by: Eitan Philip  Date:    04/05/2024  Time:    05:21               Narrative:    EXAMINATION:  XR CHEST AP PORTABLE    CLINICAL HISTORY:  Unspecified atrial fibrillation    TECHNIQUE:  Single frontal view of the chest was performed.    COMPARISON:  None    FINDINGS:  Midline thoracic trachea, allowing for rightward rotation of the patient's torso.  Sternotomy wires, suboptimally visualized.    Mildly enlarged cardiopericardial silhouette.    Faintly visible cardiac valve prosthesis, likely aortic.    Relatively lucent appearance of the lungs, likely on the basis of underlying  pulmonary emphysema.    A 13 mm irregularly-shaped nodular opacity projects over the left 5th anterior intercostal space and left 7th posterior intercostal space, lateral to the left midclavicular line.    Bandlike bilateral infrahilar opacities, left greater than right, most likely represent subsegmental atelectasis.    Otherwise, no consolidation, discrete pneumothorax, or blunting of either lateral costophrenic angle is demonstrated radiographically.    Degenerative changes in the suboptimally visualized spine, and in the AC joints bilaterally.    EKG leads project over the visualized torso.    No acute radiographic abnormality is demonstrated in the visualized upper abdomen.

## 2024-04-05 NOTE — ASSESSMENT & PLAN NOTE
- reports episode of hypotension PTA. Normotensive in ED  - will continue home metoprolol  - hold lasix and HCTZ. Monitor BP closely and will restart when clinically warranted

## 2024-04-05 NOTE — ED NOTES
Assumed care of the patient.  Pt on continuous cardiac monitoring. Pt in hospital gown, side rails up X2, bed low and locked, and call light is placed within reach. Wife at bedside at this time. Pt denies any complaints or needs.    
Bed: 12  Expected date:   Expected time:   Means of arrival:   Comments:  Zi     
Bed: EDOU04  Expected date: 4/5/24  Expected time:   Means of arrival:   Comments:  12  
Cardiology at bedside  
Notified primary team of pts HR of 49. Lopressor held. Pt on cardiac monitor and in and out of afib.  
Nurses Note -- 4 Eyes      4/5/2024   3:52 PM      Skin assessed during: Q Shift Change      [] No Altered Skin Integrity Present    []Prevention Measures Documented      [x] Yes- Altered Skin Integrity Present or Discovered   [x] LDA Added if Not in Epic (Describe Wound)   [] New Altered Skin Integrity was Present on Admit and Documented in LDA   [] Wound Image Taken    Wound Care Consulted? No    Attending Nurse:  DONNIE Hamilton RN    Second RN/Staff Member:  Paul         
POCT [id number]  
POCT trop #1-0.21  
Report given to Mariana. Awaiting tele box and will send pt to unit.   
Transport here for patient. Tele box called into war room.   
suicidal thoughts

## 2024-04-05 NOTE — HPI
Karthikeyan Pinon is a 68 y.o. male with history of hypertrophic cardiomyopathy status post myectomy and aortic valve replacement on 3/28 presenting with chief complaint of postop problem.  Patient reports having rapid heart rate and episode of hypotension earlier today.  His heart rate has been varying from the 90s to 150s and he has been in AFib since arriving in the ED. He does not have a history of AFib.  He denies chest pain or shortness of breath, syncope.  He endorses transient lightheadedness that has since resolved     In ED: Afebrile. HR ranging from 90s to 150. Otherwise, VSS and satting well on RA. No leukocytosis. Hgb 10.9. CMP remarkable for BUN 24, albumin 3.1, AST 64 and ALT 76. . Trop 0.529. TSH 5.1, but T4 wnl. CXR with 13 mm nodular opacity (likely artifact, but nodule or granuloma not excluded), atelectasis, probably pulmonary emphysema, enlarged cardiopericardial silhouette, and AV replacement. Given IV metoprolol 5 mg x1 and po lopressor 50 mg. Placed in observation.

## 2024-04-05 NOTE — ASSESSMENT & PLAN NOTE
67 y/o M with history of HOCM s/p septal myomectomy on 3/28 presenting here with atrial fibrillation, now converted back to sinus after IV metoprolol. Upon chart review, there were concerns for post op AF  vs ST with PACs at [facility]. He was rate controlled at time of discharge. Patient has significant history of palpitations prior in the past that worsened within the past couple of months. He was recently seen EP in 5 months ago for evaluation and found to have HCOM. Etiology likely 2/2 to HCOM vs post op afib. Patient will need to be started on anticoagulation therapy until further evaluation by EP.     Recommendations:  - continue toprol-xl 50 mg  - start Eliquis 5 mg  - F/u ECHO  - 30 day event monitor at discharge   - EP referral for follow up

## 2024-04-05 NOTE — ASSESSMENT & PLAN NOTE
- likely from recent myectomy and aortic valve replacement on 3/28, but unknown baseline. Will continue to trend q6h

## 2024-04-06 VITALS
WEIGHT: 203 LBS | HEART RATE: 90 BPM | BODY MASS INDEX: 27.5 KG/M2 | DIASTOLIC BLOOD PRESSURE: 78 MMHG | RESPIRATION RATE: 18 BRPM | HEIGHT: 72 IN | SYSTOLIC BLOOD PRESSURE: 152 MMHG | TEMPERATURE: 99 F | OXYGEN SATURATION: 94 %

## 2024-04-06 LAB
ALBUMIN SERPL BCP-MCNC: 2.8 G/DL (ref 3.5–5.2)
ALP SERPL-CCNC: 112 U/L (ref 55–135)
ALT SERPL W/O P-5'-P-CCNC: 60 U/L (ref 10–44)
ANION GAP SERPL CALC-SCNC: 9 MMOL/L (ref 8–16)
ASCENDING AORTA: 3.6 CM
AST SERPL-CCNC: 51 U/L (ref 10–40)
AV INDEX (PROSTH): 0.53
AV MEAN GRADIENT: 18 MMHG
AV PEAK GRADIENT: 32 MMHG
AV VALVE AREA BY VELOCITY RATIO: 1.77 CM²
AV VALVE AREA: 1.83 CM²
AV VELOCITY RATIO: 0.51
BILIRUB SERPL-MCNC: 0.5 MG/DL (ref 0.1–1)
BSA FOR ECHO PROCEDURE: 2.16 M2
BUN SERPL-MCNC: 23 MG/DL (ref 8–23)
CALCIUM SERPL-MCNC: 9 MG/DL (ref 8.7–10.5)
CHLORIDE SERPL-SCNC: 107 MMOL/L (ref 95–110)
CO2 SERPL-SCNC: 21 MMOL/L (ref 23–29)
CREAT SERPL-MCNC: 0.8 MG/DL (ref 0.5–1.4)
CV ECHO LV RWT: 0.63 CM
DOP CALC AO PEAK VEL: 2.84 M/S
DOP CALC AO VTI: 46.16 CM
DOP CALC LVOT AREA: 3.5 CM2
DOP CALC LVOT DIAMETER: 2.1 CM
DOP CALC LVOT PEAK VEL: 1.45 M/S
DOP CALC LVOT STROKE VOLUME: 84.37 CM3
DOP CALCLVOT PEAK VEL VTI: 24.37 CM
E WAVE DECELERATION TIME: 166.44 MSEC
E/A RATIO: 1.19
E/E' RATIO: 10.86 M/S
ECHO LV POSTERIOR WALL: 1.3 CM (ref 0.6–1.1)
ERYTHROCYTE [DISTWIDTH] IN BLOOD BY AUTOMATED COUNT: 13.9 % (ref 11.5–14.5)
EST. GFR  (NO RACE VARIABLE): >60 ML/MIN/1.73 M^2
FRACTIONAL SHORTENING: 29 % (ref 28–44)
GLUCOSE SERPL-MCNC: 84 MG/DL (ref 70–110)
HCT VFR BLD AUTO: 30.6 % (ref 40–54)
HGB BLD-MCNC: 10.3 G/DL (ref 14–18)
INTERVENTRICULAR SEPTUM: 1.3 CM (ref 0.6–1.1)
IVRT: 119.89 MSEC
LA MAJOR: 5.87 CM
LA MINOR: 6.05 CM
LA WIDTH: 4.1 CM
LEFT ATRIUM SIZE: 4.03 CM
LEFT ATRIUM VOLUME INDEX MOD: 37.4 ML/M2
LEFT ATRIUM VOLUME INDEX: 39.1 ML/M2
LEFT ATRIUM VOLUME MOD: 80 CM3
LEFT ATRIUM VOLUME: 83.69 CM3
LEFT INTERNAL DIMENSION IN SYSTOLE: 2.93 CM (ref 2.1–4)
LEFT VENTRICLE DIASTOLIC VOLUME INDEX: 34.69 ML/M2
LEFT VENTRICLE DIASTOLIC VOLUME: 74.23 ML
LEFT VENTRICLE MASS INDEX: 90 G/M2
LEFT VENTRICLE SYSTOLIC VOLUME INDEX: 15.4 ML/M2
LEFT VENTRICLE SYSTOLIC VOLUME: 33.04 ML
LEFT VENTRICULAR INTERNAL DIMENSION IN DIASTOLE: 4.1 CM (ref 3.5–6)
LEFT VENTRICULAR MASS: 193.49 G
LV LATERAL E/E' RATIO: 10.86 M/S
LV SEPTAL E/E' RATIO: 10.86 M/S
MAGNESIUM SERPL-MCNC: 2.1 MG/DL (ref 1.6–2.6)
MCH RBC QN AUTO: 30.4 PG (ref 27–31)
MCHC RBC AUTO-ENTMCNC: 33.7 G/DL (ref 32–36)
MCV RBC AUTO: 90 FL (ref 82–98)
MV PEAK A VEL: 0.64 M/S
MV PEAK E VEL: 0.76 M/S
MV STENOSIS PRESSURE HALF TIME: 48.27 MS
MV VALVE AREA P 1/2 METHOD: 4.56 CM2
PISA TR MAX VEL: 2.75 M/S
PLATELET # BLD AUTO: 304 K/UL (ref 150–450)
PMV BLD AUTO: 8.5 FL (ref 9.2–12.9)
POTASSIUM SERPL-SCNC: 3.8 MMOL/L (ref 3.5–5.1)
PROT SERPL-MCNC: 6 G/DL (ref 6–8.4)
RA MAJOR: 5.57 CM
RA PRESSURE ESTIMATED: 3 MMHG
RA WIDTH: 3.74 CM
RBC # BLD AUTO: 3.39 M/UL (ref 4.6–6.2)
RIGHT ATRIAL AREA: 20 CM2
RIGHT VENTRICULAR END-DIASTOLIC DIMENSION: 3.64 CM
RV TB RVSP: 6 MMHG
SINUS: 3.54 CM
SODIUM SERPL-SCNC: 137 MMOL/L (ref 136–145)
STJ: 2.88 CM
TDI LATERAL: 0.07 M/S
TDI SEPTAL: 0.07 M/S
TDI: 0.07 M/S
TR MAX PG: 30 MMHG
TRICUSPID ANNULAR PLANE SYSTOLIC EXCURSION: 0.84 CM
TV REST PULMONARY ARTERY PRESSURE: 33 MMHG
WBC # BLD AUTO: 9.48 K/UL (ref 3.9–12.7)
Z-SCORE OF LEFT VENTRICULAR DIMENSION IN END DIASTOLE: -5.18
Z-SCORE OF LEFT VENTRICULAR DIMENSION IN END SYSTOLE: -2.84

## 2024-04-06 PROCEDURE — 80053 COMPREHEN METABOLIC PANEL: CPT

## 2024-04-06 PROCEDURE — 99213 OFFICE O/P EST LOW 20 MIN: CPT | Mod: 25,,, | Performed by: INTERNAL MEDICINE

## 2024-04-06 PROCEDURE — G0378 HOSPITAL OBSERVATION PER HR: HCPCS

## 2024-04-06 PROCEDURE — 36415 COLL VENOUS BLD VENIPUNCTURE: CPT

## 2024-04-06 PROCEDURE — 25000003 PHARM REV CODE 250: Performed by: PHYSICIAN ASSISTANT

## 2024-04-06 PROCEDURE — 83735 ASSAY OF MAGNESIUM: CPT

## 2024-04-06 PROCEDURE — 85027 COMPLETE CBC AUTOMATED: CPT

## 2024-04-06 PROCEDURE — 25000003 PHARM REV CODE 250: Performed by: INTERNAL MEDICINE

## 2024-04-06 RX ADMIN — METOPROLOL SUCCINATE 50 MG: 50 TABLET, EXTENDED RELEASE ORAL at 08:04

## 2024-04-06 RX ADMIN — POTASSIUM CHLORIDE 10 MEQ: 10 CAPSULE, COATED, EXTENDED RELEASE ORAL at 08:04

## 2024-04-06 RX ADMIN — ASPIRIN 81 MG: 81 TABLET, COATED ORAL at 08:04

## 2024-04-06 RX ADMIN — Medication 400 MG: at 08:04

## 2024-04-06 RX ADMIN — PANTOPRAZOLE SODIUM 20 MG: 20 TABLET, DELAYED RELEASE ORAL at 08:04

## 2024-04-06 RX ADMIN — APIXABAN 5 MG: 5 TABLET, FILM COATED ORAL at 08:04

## 2024-04-06 NOTE — DISCHARGE SUMMARY
[facility] [address] [place] [address] Medicine  Discharge Summary      Patient Name: Karthikeyan Pinon  MRN: [id number]  RYAN: [id number]  Patient Class: OP- Observation  Admission Date: 4/5/2024  Hospital Length of Stay: 0 days  Discharge Date and Time: 4/6/2024  2:36 PM  Attending Physician: [place] att. providers found   Discharging Provider: Gaye Echevarria PA-C  Primary Care Provider: Kourtney Love MD  [facility] Medicine Team: [facility] Gaye Echevarria PA-C  Primary Care Team: [facility]    HPI:   Karthikeyan Pinon is a 68 y.o. male with history of hypertrophic cardiomyopathy status post myectomy and aortic valve replacement on 3/28 presenting with chief complaint of postop problem.  Patient reports having rapid heart rate and episode of hypotension earlier today.  His heart rate has been varying from the 90s to 150s and he has been in AFib since arriving in the ED. He does not have a history of AFib.  He denies chest pain or shortness of breath, syncope.  He endorses transient lightheadedness that has since resolved     In ED: Afebrile. HR ranging from 90s to 150. Otherwise, VSS and satting well on RA. No leukocytosis. Hgb 10.9. CMP remarkable for BUN 24, albumin 3.1, AST 64 and ALT 76. . Trop 0.529. TSH 5.1, but T4 wnl. CXR with 13 mm nodular opacity (likely artifact, but nodule or granuloma not excluded), atelectasis, probably pulmonary emphysema, enlarged cardiopericardial silhouette, and AV replacement. Given IV metoprolol 5 mg x1 and po lopressor 50 mg. Placed in observation.     * No surgery found *      Hospital Course:   Karthikeyan Pinon with history of HOCM s/p septal myomectomy on 3/28 who was placed in observation for atrial fibrillation, now converted back to sinus after IV metoprolol. Cardiology was consulted at admission. Upon chart review, there were concerns for post-op AF vs ST with PACs at [facility]. He was rate controlled at time of discharge. Echo with EF 60-65%, normal diastolic  function, s/p myectomy and TAVR. Cardiology recommended continuing home toprol, starting eliquis BID, and obtaining a 30 day event monitor at discharge.     Pt was seen and evaluated by me this morning, reports feeling well, and is eager to discharge home. All questions were answered. Patient acknowledged understanding of discharge instructions and feels safe to discharge home. Patient was discharged on 4/6/2024 in stable condition with PCP and cardiology follow-up. Education regarding condition provided and return precautions given.     Physical Exam  Gen: in NAD, appears stated age  Neuro: AAOx3, motor, sensory, and strength grossly intact BL  HEENT: EOMI, PERRLA; no JVD appreciated  CVS: RRR, no m/r/g  Resp: lungs CTAB, no w/r/r; no belabored breathing or accessory muscle use appreciated   Abd: NTND, soft to palpation  Extrem: no UE or LE edema BL     Goals of Care Treatment Preferences:  Code Status: Full Code      Consults:   Consults (From admission, onward)          Status Ordering Provider     Inpatient consult to Cardiology  Once        Provider:  (Not yet assigned)    Completed ANTOINETTE MATA            No new Assessment & Plan notes have been filed under this hospital service since the last note was generated.  Service: Hospital Medicine    Final Active Diagnoses:    Diagnosis Date Noted POA    PRINCIPAL PROBLEM:  Atrial fibrillation [I48.91] 04/05/2024 Yes    Elevated troponin [R79.89] 04/05/2024 Yes    Transaminitis [R74.01] 04/05/2024 Yes    Subclinical hypothyroidism [E03.8] 04/05/2024 Yes    HOCM (hypertrophic obstructive cardiomyopathy) [I42.1] 03/28/2024 Yes    HLD (hyperlipidemia) [E78.5] 03/28/2024 Yes    Primary hypertension [I10] 03/28/2024 Yes      Problems Resolved During this Admission:       Discharged Condition: good    Disposition: Home or Self Care    Follow Up:    Patient Instructions:      ACCEPT - Ambulatory referral/consult to Cardiac Electrophysiology   Standing  Status: Future   Referral Priority: Routine Referral Type: Consultation   Referral Reason: Specialty Services Required   Requested Specialty: Cardiology   Number of Visits Requested: 1     Notify your health care provider if you experience any of the following:  temperature >100.4     Notify your health care provider if you experience any of the following:  severe uncontrolled pain     Notify your health care provider if you experience any of the following:  difficulty breathing or increased cough     Notify your health care provider if you experience any of the following:  persistent dizziness, light-headedness, or visual disturbances     Notify your health care provider if you experience any of the following:  increased confusion or weakness     Cardiac event monitor   Standing Status: Future Standing Exp. Date: 04/06/25     Order Specific Question Answer Comments   Cardiac Event Monitor Auto Trigger    Release to patient Immediate      Activity as tolerated       Significant Diagnostic Studies:   Echo    S/p myectomy ( HCM) and AVR ( bioprosthetic AV)    Left Ventricle: The left ventricle is normal in size. Normal wall   thickness. There is concentric remodeling. There is normal systolic   function with a visually estimated ejection fraction of 60 - 65%. There is   normal diastolic function.    Right Ventricle: Normal right ventricular cavity size. There is   hypertrophy. Right ventricle wall motion  is normal. Systolic function is   normal.    Left Atrium: Left atrium is mildly dilated.    Right Atrium: Right atrium is mildly dilated.    Aortic Valve: There is a bovine prosthetic valve in the aortic   position. It is reported to be a 25 mm Inspiris valve.    Tricuspid Valve: There is mild regurgitation.    Pulmonic Valve: There is mild regurgitation.    Aorta: Aortic root is normal in size measuring 3.54 cm. Ascending aorta   is mildly dilated measuring 3.6 cm.    Pulmonary Artery: The estimated pulmonary artery  systolic pressure is   33 mmHg.    IVC/SVC: Normal venous pressure at 3 mmHg.    Pericardium: There is a small circumferential partially organized   effusion.        Pending Diagnostic Studies:       Procedure Component Value Units Date/Time    Troponin I #2 [[id number]] Collected: 04/05/24 0328    Order Status: Sent Lab Status: No result     Specimen: Blood            Medications:  Reconciled Home Medications:      Medication List        START taking these medications      apixaban 5 mg Tab  Commonly known as: ELIQUIS  Take 1 tablet (5 mg total) by mouth 2 (two) times daily.            CONTINUE taking these medications      aspirin 81 MG Chew  Take 81 mg by mouth once daily.     atorvastatin 10 MG tablet  Commonly known as: LIPITOR  Take 10 mg by mouth every evening.     magnesium oxide 400 mg (241.3 mg magnesium) tablet  Commonly known as: MAG-OX  Take 400 mg by mouth once daily.     metoprolol succinate 50 MG 24 hr tablet  Commonly known as: TOPROL-XL  Take 25 mg by mouth 2 (two) times daily.     pantoprazole 20 MG tablet  Commonly known as: PROTONIX  Take 20 mg by mouth once daily.     potassium chloride 10 MEQ Tbsr  Commonly known as: KLOR-CON  Take 10 mEq by mouth once daily.            STOP taking these medications      oxyCODONE 5 MG immediate release tablet  Commonly known as: ROXICODONE              Indwelling Lines/Drains at time of discharge:   Lines/Drains/Airways       None                   Time spent on the discharge of patient: 36 minutes         Gaye Echevarria PA-C  Department of Hospital Medicine  [facility]

## 2024-04-06 NOTE — PLAN OF CARE
[facility]  Discharge Assessment    Primary Care Provider: Kourtney Love MD     Discharge Assessment (most recent)       BRIEF DISCHARGE ASSESSMENT - 04/06/24 1200          Discharge Planning    Assessment Type Discharge Planning Brief Assessment     Resource/Environmental Concerns none     Support Systems Spouse/significant other;Friends/neighbors     Equipment Currently Used at Home none     Current Living Arrangements home     Patient/Family Anticipates Transition to home with family     Patient/Family Anticipated Services at Transition none     DME Needed Upon Discharge  none     Discharge Plan A Home with family     Discharge Plan B Home with family        Physical Activity    On average, how many days per week do you engage in moderate to strenuous exercise (like a brisk walk)? Patient declined     On average, how many minutes do you engage in exercise at this level? Patient declined        Financial Resource Strain    How hard is it for you to pay for the very basics like food, housing, medical care, and heating? Patient declined        Housing Stability    In the last 12 months, was there a time when you were not able to pay the mortgage or rent on time? Patient declined     In the last 12 months, was there a time when you did not have a steady place to sleep or slept in a shelter (including now)? Patient declined        Transportation Needs    In the past 12 months, has lack of transportation kept you from medical appointments or from getting medications? Patient declined     In the past 12 months, has lack of transportation kept you from meetings, work, or from getting things needed for daily living? Patient declined        Food Insecurity    Within the past 12 months, you worried that your food would run out before you got the money to buy more. Patient declined     Within the past 12 months, the food you bought just didn't last and you didn't have money to get more. Patient declined         Stress    Do you feel stress - tense, restless, nervous, or anxious, or unable to sleep at night because your mind is troubled all the time - these days? Patient declined        Social Connections    In a typical week, how many times do you talk on the phone with family, friends, or neighbors? Patient declined     How often do you get together with friends or relatives? Patient declined     How often do you attend Episcopalian or Roman [employer] services? Patient declined     Do you belong to any clubs or organizations such as Episcopalian groups, unions, [employer]eri[employer]oo or athletic groups, or school groups? Patient declined     How often do you attend meetings of the clubs or organizations you belong to? Patient declined     Are you , , , , never , or living with a partner? Patient declined        Alcohol Use    Q1: How often do you have a drink containing alcohol? Patient declined     Q2: How many drinks containing alcohol do you have on a typical day when you are drinking? Patient declined     Q3: How often do you have six or more drinks on one occasion? Patient declined                 Discharge Plan A and Plan B have been determined by review of patient's clinical status, future medical and therapeutic needs, and coverage/benefits for post-acute care in coordination with multidisciplinary team members.      Shira Benson RN  Weekend  - [facility]  Spectralink: [phone]

## 2024-04-06 NOTE — ASSESSMENT & PLAN NOTE
67 y/o M with history of HOCM s/p septal myomectomy on 3/28 presenting here with atrial fibrillation, now converted back to sinus after IV metoprolol. Upon chart review, there were concerns for post op AF  vs ST with PACs at [facility]. He was rate controlled at time of discharge. Patient has significant history of palpitations prior in the past that worsened within the past couple of months. He was recently seen EP in 5 months ago for evaluation and found to have HCOM. Etiology likely 2/2 to HCOM vs post op afib. Patient will need to be started on anticoagulation therapy until further evaluation by EP.     Recommendations:  - continue toprol-xl 50 mg   - continue Eliquis 5 mg  - 30 day event monitor at discharge   - patient will follow up with his Cardiology Dr. Dumont at [facility].

## 2024-04-06 NOTE — NURSING
Discharge home, IV removed with catheter intact, discharge instructions reviewed with and provided to patient. Telemetry box TTX 1370 removed and returned. Ambulated off unit with friend, no signs of distress noted.

## 2024-04-06 NOTE — DISCHARGE INSTRUCTIONS
It has been a pleasure caring for you, and I hope you continue to feel better and stronger every day! Please do not hesitate to reach out to me with any questions or concerns.     Gaye Echevarria PA-C  [facility]  [phone]

## 2024-04-06 NOTE — HOSPITAL COURSE
Karthikeyan Pinon with history of HOCM s/p septal myomectomy on 3/28 who was placed in observation for atrial fibrillation, now converted back to sinus after IV metoprolol. Cardiology was consulted at admission. Upon chart review, there were concerns for post-op AF vs ST with PACs at [facility]. He was rate controlled at time of discharge. Echo with EF 60-65%, normal diastolic function, s/p myectomy and TAVR. Cardiology recommended continuing home toprol, starting eliquis BID, and obtaining a 30 day event monitor at discharge.     Pt was seen and evaluated by me this morning, reports feeling well, and is eager to discharge home. All questions were answered. Patient acknowledged understanding of discharge instructions and feels safe to discharge home. Patient was discharged on 4/6/2024 in stable condition with PCP and cardiology follow-up. Education regarding condition provided and return precautions given.     Physical Exam  Gen: in NAD, appears stated age  Neuro: AAOx3, motor, sensory, and strength grossly intact BL  HEENT: EOMI, PERRLA; no JVD appreciated  CVS: RRR, no m/r/g  Resp: lungs CTAB, no w/r/r; no belabored breathing or accessory muscle use appreciated   Abd: NTND, soft to palpation  Extrem: no UE or LE edema BL

## 2024-04-06 NOTE — PLAN OF CARE
CM noted discharge order and reviewed chart. Disposition is home with no needs. Patient's friend providing discharge transportation.      Shira Benson RN  Weekend  - [facility] [facility]  Spectralink: [phone]

## 2024-04-06 NOTE — SUBJECTIVE & OBJECTIVE
Interval History: No acute events overnight, afebrile, hemodynamically stable. Stable. Wants to go home today after echo. Will follow up with Dr. Dumont at [facility]    Rate controlled <110 bpm throughout the night    ROS  Objective:     Vital Signs (Most Recent):  Temp: (!) 77.1 °F (25.1 °C) (04/06/24 0814)  Pulse: 90 (04/06/24 0814)  Resp: 15 (04/06/24 0814)  BP: 122/73 (04/06/24 0814)  SpO2: 95 % (04/06/24 0814) Vital Signs (24h Range):  Temp:  [77.1 °F (25.1 °C)-98.9 °F (37.2 °C)] 77.1 °F (25.1 °C)  Pulse:  [49-96] 90  Resp:  [15-20] 15  SpO2:  [94 %-97 %] 95 %  BP: (109-141)/(63-85) 122/73     Weight: 92 kg (202 lb 14.9 oz)  Body mass index is 27.52 kg/m².     SpO2: 95 %       No intake or output data in the 24 hours ending 04/06/24 0838    Lines/Drains/Airways       Peripheral Intravenous Line  Duration                  Peripheral IV - Single Lumen 04/05/24 0325 20 G Left Wrist 1 day                       Physical Exam  Vitals and nursing note reviewed.   HENT:      Head: Normocephalic and atraumatic.      Nose: Nose normal.      Mouth/Throat:      Mouth: Mucous membranes are moist.   Eyes:      General: No scleral icterus.  Cardiovascular:      Rate and Rhythm: Normal rate.   Pulmonary:      Breath sounds: Normal breath sounds.   Abdominal:      Palpations: Abdomen is soft.   Musculoskeletal:      Right lower leg: No edema.      Left lower leg: No edema.   Skin:     General: Skin is warm.   Neurological:      General: No focal deficit present.      Mental Status: He is alert and oriented to person, place, and time. Mental status is at baseline.   Psychiatric:         Mood and Affect: Mood normal.            Significant Labs: CMP   Recent Labs   Lab 04/05/24  0328 04/06/24  0210    137   K 4.0 3.8    107   CO2 21* 21*    84   BUN 24* 23   CREATININE 1.0 0.8   CALCIUM 9.5 9.0   PROT 6.5 6.0   ALBUMIN 3.1* 2.8*   BILITOT 0.4 0.5   ALKPHOS 131 112   AST 64* 51*   ALT 76* 60*   ANIONGAP 8 9    and  CBC   Recent Labs   Lab 04/05/24  0328 04/06/24  0211   WBC 10.45 9.48   HGB 10.9* 10.3*   HCT 32.6* 30.6*    304       Significant Imaging: Echocardiogram: Transthoracic echo (TTE) complete (Cupid Only):   Results for orders placed or performed during the hospital encounter of 04/05/24   Echo   Result Value Ref Range    RA Width 3.74 cm    LA volume (mod) 64.92 cm3    Left Atrium Major Axis 5.87 cm    Left Atrium Minor Axis 6.05 cm    RA Major Axis 5.57 cm    LV Diastolic Volume 74.23 mL    LV Systolic Volume 33.04 mL    MV Peak A Mike 0.64 m/s    MV stenosis pressure 1/2 time 48.27 ms    TR Max Mike 2.75 m/s    MV Peak E Mike 0.76 m/s    Ao VTI 46.16 cm    Ao peak mike 2.84 m/s    LVOT peak VTI 24.37 cm    LVOT peak mike 1.45 m/s    LVOT diameter 2.10 cm    IVRT 119.89 msec    E wave deceleration time 166.44 msec    AV mean gradient 18 mmHg    TAPSE 0.84 cm    RVDD 3.64 cm    LA size 4.03 cm    Ascending aorta 3.45 cm    STJ 2.88 cm    Sinus 3.54 cm    LVIDs 2.93 2.1 - 4.0 cm    Posterior Wall 1.30 (A) 0.6 - 1.1 cm    IVS 1.30 (A) 0.6 - 1.1 cm    LVIDd 4.10 3.5 - 6.0 cm    TDI LATERAL 0.07 m/s    LA WIDTH 4.10 cm    TDI SEPTAL 0.07 m/s    LV LATERAL E/E' RATIO 10.86 m/s    LV SEPTAL E/E' RATIO 10.86 m/s    FS 29 28 - 44 %    LA volume 83.69 cm3    LV mass 193.49 g    ZLVIDD -5.18     ZLVIDS -2.84     Left Ventricle Relative Wall Thickness 0.63 cm    AV valve area 1.83 cm²    AV Velocity Ratio 0.51     AV index (prosthetic) 0.53     MV valve area p 1/2 method 4.56 cm2    E/A ratio 1.19     Mean e' 0.07 m/s    LVOT area 3.5 cm2    LVOT stroke volume 84.37 cm3    AV peak gradient 32 mmHg    E/E' ratio 10.86 m/s    LV Systolic Volume Index 15.4 mL/m2    LV Diastolic Volume Index 34.69 mL/m2    LA Volume Index 39.1 mL/m2    LV Mass Index 90 g/m2    Triscuspid Valve Regurgitation Peak Gradient 30 mmHg    LA Volume Index (Mod) 30.3 mL/m2    JEAN CLAUDE by Velocity Ratio 1.77 cm²    BSA 2.16 m2

## 2024-04-06 NOTE — NURSING
Nurses Note -- 4 Eyes      4/5/2024   11:23 PM      Skin assessed during: Admit      [x] No Altered Skin Integrity Present    []Prevention Measures Documented      [] Yes- Altered Skin Integrity Present or Discovered   [] LDA Added if Not in Epic (Describe Wound)   [] New Altered Skin Integrity was Present on Admit and Documented in LDA   [] Wound Image Taken    Wound Care Consulted? No    Attending Nurse:  Tequila Dorman RN/Staff Member:  Shayy

## 2024-04-06 NOTE — PLAN OF CARE
[facility]  Discharge Final Note    Primary Care Provider: Kourtney Love MD    Expected Discharge Date: 4/6/2024    Patient cleared for discharge from case management standpoint.    Final Discharge Note (most recent)       Final Note - 04/06/24 1308          Final Note    Assessment Type Final Discharge Note     Anticipated Discharge Disposition Home or Self Care     What phone number can be called within the next 1-3 days to see how you are doing after discharge? [phone]     Hospital Resources/Appts/Education Provided Provided patient/caregiver with written discharge plan information        Post-Acute Status    Discharge Delays None known at this time                   Shira Benson RN  Weekend  - [facility]  Spectralink: [phone]

## 2024-04-06 NOTE — PROGRESS NOTES
[facility]  Cardiology  Progress Note    Patient Name: Karthikeyan Pinon  MRN: [id number]  Admission Date: 4/5/2024  Hospital Length of Stay: 0 days  Code Status: Full Code   Attending Physician: Hunter Hunt MD   Primary Care Physician: Kourtney Love MD  Expected Discharge Date: 4/6/2024  Principal Problem:Atrial fibrillation    Subjective:     Hospital Course:   No notes on file    Interval History: No acute events overnight, afebrile, hemodynamically stable. Stable. Wants to go home today after echo. Will follow up with Dr. Dumont at [facility]    Rate controlled <110 bpm throughout the night    ROS  Objective:     Vital Signs (Most Recent):  Temp: (!) 77.1 °F (25.1 °C) (04/06/24 0814)  Pulse: 90 (04/06/24 0814)  Resp: 15 (04/06/24 0814)  BP: 122/73 (04/06/24 0814)  SpO2: 95 % (04/06/24 0814) Vital Signs (24h Range):  Temp:  [77.1 °F (25.1 °C)-98.9 °F (37.2 °C)] 77.1 °F (25.1 °C)  Pulse:  [49-96] 90  Resp:  [15-20] 15  SpO2:  [94 %-97 %] 95 %  BP: (109-141)/(63-85) 122/73     Weight: 92 kg (202 lb 14.9 oz)  Body mass index is 27.52 kg/m².     SpO2: 95 %       No intake or output data in the 24 hours ending 04/06/24 0838    Lines/Drains/Airways       Peripheral Intravenous Line  Duration                  Peripheral IV - Single Lumen 04/05/24 0325 20 G Left Wrist 1 day                       Physical Exam  Vitals and nursing note reviewed.   HENT:      Head: Normocephalic and atraumatic.      Nose: Nose normal.      Mouth/Throat:      Mouth: Mucous membranes are moist.   Eyes:      General: No scleral icterus.  Cardiovascular:      Rate and Rhythm: Normal rate.   Pulmonary:      Breath sounds: Normal breath sounds.   Abdominal:      Palpations: Abdomen is soft.   Musculoskeletal:      Right lower leg: No edema.      Left lower leg: No edema.   Skin:     General: Skin is warm.   Neurological:      General: No focal deficit present.      Mental Status: He is alert and oriented to person, place, and time.  Mental status is at baseline.   Psychiatric:         Mood and Affect: Mood normal.            Significant Labs: CMP   Recent Labs   Lab 04/05/24  0328 04/06/24  0210    137   K 4.0 3.8    107   CO2 21* 21*    84   BUN 24* 23   CREATININE 1.0 0.8   CALCIUM 9.5 9.0   PROT 6.5 6.0   ALBUMIN 3.1* 2.8*   BILITOT 0.4 0.5   ALKPHOS 131 112   AST 64* 51*   ALT 76* 60*   ANIONGAP 8 9    and CBC   Recent Labs   Lab 04/05/24  0328 04/06/24  0211   WBC 10.45 9.48   HGB 10.9* 10.3*   HCT 32.6* 30.6*    304       Significant Imaging: Echocardiogram: Transthoracic echo (TTE) complete (Cupid Only):   Results for orders placed or performed during the hospital encounter of 04/05/24   Echo   Result Value Ref Range    RA Width 3.74 cm    LA volume (mod) 64.92 cm3    Left Atrium Major Axis 5.87 cm    Left Atrium Minor Axis 6.05 cm    RA Major Axis 5.57 cm    LV Diastolic Volume 74.23 mL    LV Systolic Volume 33.04 mL    MV Peak A Mike 0.64 m/s    MV stenosis pressure 1/2 time 48.27 ms    TR Max Mike 2.75 m/s    MV Peak E Mike 0.76 m/s    Ao VTI 46.16 cm    Ao peak mike 2.84 m/s    LVOT peak VTI 24.37 cm    LVOT peak mike 1.45 m/s    LVOT diameter 2.10 cm    IVRT 119.89 msec    E wave deceleration time 166.44 msec    AV mean gradient 18 mmHg    TAPSE 0.84 cm    RVDD 3.64 cm    LA size 4.03 cm    Ascending aorta 3.45 cm    STJ 2.88 cm    Sinus 3.54 cm    LVIDs 2.93 2.1 - 4.0 cm    Posterior Wall 1.30 (A) 0.6 - 1.1 cm    IVS 1.30 (A) 0.6 - 1.1 cm    LVIDd 4.10 3.5 - 6.0 cm    TDI LATERAL 0.07 m/s    LA WIDTH 4.10 cm    TDI SEPTAL 0.07 m/s    LV LATERAL E/E' RATIO 10.86 m/s    LV SEPTAL E/E' RATIO 10.86 m/s    FS 29 28 - 44 %    LA volume 83.69 cm3    LV mass 193.49 g    ZLVIDD -5.18     ZLVIDS -2.84     Left Ventricle Relative Wall Thickness 0.63 cm    AV valve area 1.83 cm²    AV Velocity Ratio 0.51     AV index (prosthetic) 0.53     MV valve area p 1/2 method 4.56 cm2    E/A ratio 1.19     Mean e' 0.07 m/s    LVOT  area 3.5 cm2    LVOT stroke volume 84.37 cm3    AV peak gradient 32 mmHg    E/E' ratio 10.86 m/s    LV Systolic Volume Index 15.4 mL/m2    LV Diastolic Volume Index 34.69 mL/m2    LA Volume Index 39.1 mL/m2    LV Mass Index 90 g/m2    Triscuspid Valve Regurgitation Peak Gradient 30 mmHg    LA Volume Index (Mod) 30.3 mL/m2    JEAN CLAUDE by Velocity Ratio 1.77 cm²    BSA 2.16 m2     Assessment and Plan:         * Atrial fibrillation  67 y/o M with history of HOCM s/p septal myomectomy on 3/28 presenting here with atrial fibrillation, now converted back to sinus after IV metoprolol. Upon chart review, there were concerns for post op AF  vs ST with PACs at [facility]. He was rate controlled at time of discharge. Patient has significant history of palpitations prior in the past that worsened within the past couple of months. He was recently seen EP in 5 months ago for evaluation and found to have HCOM. Etiology likely 2/2 to HCOM vs post op afib. Patient will need to be started on anticoagulation therapy until further evaluation by EP.     Recommendations:  - continue toprol-xl 50 mg   - continue Eliquis 5 mg  - 30 day event monitor at discharge   - patient will follow up with his Cardiology Dr. Dumont at [facility].         VTE Risk Mitigation (From admission, onward)           Ordered     apixaban tablet 5 mg  2 times daily         04/05/24 1735     IP VTE LOW RISK PATIENT  Once         04/05/24 0632                    Drake Correa DO  Cardiology  [facility]

## (undated) DEVICE — POWDER ARISTA AH 1GM

## (undated) DEVICE — SUT CHROMIC 3-0 SH 27IN GUT

## (undated) DEVICE — DRESSING TELFA STRL 4X3 LF

## (undated) DEVICE — SYR 50CC LL

## (undated) DEVICE — BLADE ELECTRO EDGE INSULATED

## (undated) DEVICE — TUBING XPS IRRIG TO STRAIGHTSH

## (undated) DEVICE — WARMER DRAPE STERILE LF

## (undated) DEVICE — SPONGE PATTY SURGICAL .5X3IN

## (undated) DEVICE — SEE MEDLINE ITEM 157144

## (undated) DEVICE — ELECTRODE PENCIL W/ROCKER NDL

## (undated) DEVICE — CONTAINER SPECIMEN STRL 4OZ

## (undated) DEVICE — SEE MEDLINE ITEM 152622

## (undated) DEVICE — SEE MEDLINE ITEM 156913